# Patient Record
Sex: MALE | Race: WHITE | Employment: UNEMPLOYED | URBAN - METROPOLITAN AREA
[De-identification: names, ages, dates, MRNs, and addresses within clinical notes are randomized per-mention and may not be internally consistent; named-entity substitution may affect disease eponyms.]

---

## 2017-04-06 ENCOUNTER — OFFICE VISIT (OUTPATIENT)
Dept: FAMILY MEDICINE | Facility: CLINIC | Age: 52
End: 2017-04-06

## 2017-04-06 VITALS
HEIGHT: 70 IN | HEART RATE: 84 BPM | TEMPERATURE: 98 F | SYSTOLIC BLOOD PRESSURE: 114 MMHG | RESPIRATION RATE: 20 BRPM | OXYGEN SATURATION: 96 % | DIASTOLIC BLOOD PRESSURE: 74 MMHG | BODY MASS INDEX: 43.72 KG/M2 | WEIGHT: 305.4 LBS

## 2017-04-06 DIAGNOSIS — F14.10 COCAINE ABUSE, EPISODIC (H): ICD-10-CM

## 2017-04-06 DIAGNOSIS — F17.208 NICOTINE DEPENDENCE WITH OTHER NICOTINE-INDUCED DISORDER, UNSPECIFIED NICOTINE PRODUCT TYPE: ICD-10-CM

## 2017-04-06 DIAGNOSIS — E03.9 HYPOTHYROIDISM, UNSPECIFIED TYPE: ICD-10-CM

## 2017-04-06 DIAGNOSIS — G47.33 OSA (OBSTRUCTIVE SLEEP APNEA): ICD-10-CM

## 2017-04-06 DIAGNOSIS — Z76.89 ENCOUNTER TO ESTABLISH CARE: Primary | ICD-10-CM

## 2017-04-06 DIAGNOSIS — Z00.00 PREVENTATIVE HEALTH CARE: ICD-10-CM

## 2017-04-06 DIAGNOSIS — I10 ESSENTIAL HYPERTENSION: ICD-10-CM

## 2017-04-06 LAB
ALBUMIN SERPL-MCNC: 4.1 MG/DL (ref 3.8–5)
ALP SERPL-CCNC: 55 U/L (ref 31.7–110.7)
ALT SERPL-CCNC: 24.6 U/L (ref 0–45)
AST SERPL-CCNC: 23.9 U/L (ref 0–55)
BILIRUB SERPL-MCNC: <0.4 MG/DL (ref 0.2–1.3)
BUN SERPL-MCNC: 20.4 MG/DL (ref 7–21)
CALCIUM SERPL-MCNC: 9.4 MG/DL (ref 8.5–10.1)
CHLORIDE SERPLBLD-SCNC: 105.8 MMOL/L (ref 98–110)
CO2 SERPL-SCNC: 25.2 MMOL/L (ref 20–32)
CREAT SERPL-MCNC: 1.3 MG/DL (ref 0.7–1.3)
GFR SERPL CREATININE-BSD FRML MDRD: 61.6 ML/MIN/1.7 M2
GLUCOSE SERPL-MCNC: 105.4 MG'DL (ref 70–99)
HBA1C MFR BLD: 5.1 % (ref 4.1–5.7)
HCT VFR BLD AUTO: 42.4 % (ref 40–53)
HEMOGLOBIN: 13.3 G/DL (ref 13.3–17.7)
MCH RBC QN AUTO: 31.4 PG (ref 26.5–35)
MCHC RBC AUTO-ENTMCNC: 31.4 G/DL (ref 32–36)
MCV RBC AUTO: 100 FL (ref 78–100)
PLATELET # BLD AUTO: 255 K/UL (ref 150–450)
POTASSIUM SERPL-SCNC: 4.4 MMOL/DL (ref 3.3–4.5)
PROT SERPL-MCNC: 6.5 G/DL (ref 6.8–8.8)
RBC # BLD AUTO: 4.24 M/UL (ref 4.4–5.9)
SODIUM SERPL-SCNC: 142.1 MMOL/L (ref 132.6–141.4)
TSH SERPL DL<=0.05 MIU/L-ACNC: 20.6 MU/L (ref 0.4–4)
WBC # BLD AUTO: 9.5 K/UL (ref 4–11)

## 2017-04-06 ASSESSMENT — ENCOUNTER SYMPTOMS
NECK PAIN: 1
FEVER: 0
UNEXPECTED WEIGHT CHANGE: 1
HEMATURIA: 0
PALPITATIONS: 0
WHEEZING: 0
COUGH: 0
SHORTNESS OF BREATH: 1
DYSPHORIC MOOD: 1
ROS SKIN COMMENTS: +DRY SKIN
DYSURIA: 0
SLEEP DISTURBANCE: 1
FREQUENCY: 1
DIFFICULTY URINATING: 1
CHILLS: 0
POLYDIPSIA: 1

## 2017-04-06 ASSESSMENT — ANXIETY QUESTIONNAIRES
3. WORRYING TOO MUCH ABOUT DIFFERENT THINGS: SEVERAL DAYS
7. FEELING AFRAID AS IF SOMETHING AWFUL MIGHT HAPPEN: SEVERAL DAYS
6. BECOMING EASILY ANNOYED OR IRRITABLE: SEVERAL DAYS
GAD7 TOTAL SCORE: 6
5. BEING SO RESTLESS THAT IT IS HARD TO SIT STILL: SEVERAL DAYS
1. FEELING NERVOUS, ANXIOUS, OR ON EDGE: SEVERAL DAYS
IF YOU CHECKED OFF ANY PROBLEMS ON THIS QUESTIONNAIRE, HOW DIFFICULT HAVE THESE PROBLEMS MADE IT FOR YOU TO DO YOUR WORK, TAKE CARE OF THINGS AT HOME, OR GET ALONG WITH OTHER PEOPLE: VERY DIFFICULT
2. NOT BEING ABLE TO STOP OR CONTROL WORRYING: SEVERAL DAYS

## 2017-04-06 ASSESSMENT — PATIENT HEALTH QUESTIONNAIRE - PHQ9: 5. POOR APPETITE OR OVEREATING: NOT AT ALL

## 2017-04-06 NOTE — LETTER
April 7, 2017      Phan Eduardo  [address] [address]        Dear Phan,    Thank you for getting your care at [facility]. Please see below for your test results.    Your TSH level was elevated, which is likely due to not taking your thyroid medication regularly over the past several months.  Let's plan to have you on this consistently for the next 6 weeks and then recheck your TSH level again.     The screening test for Diabetes was normal and reassuring.     The blood test to look at your electrolytes, kidney and liver function was reassuring.      Resulted Orders   Hemoglobin A1c ([facility])   Result Value Ref Range    Hemoglobin A1C 5.1 4.1 - 5.7 %   Comprehensive Metabolic Panel ([facility])   Result Value Ref Range    Albumin 4.1 3.8 - 5.0 mg/dL    Alkaline Phosphatase 55.0 31.7 - 110.7 U/L    ALT 24.6 0.0 - 45.0 U/L    AST 23.9 0.0 - 55.0 U/L    Bilirubin Total <0.4 0.2 - 1.3 mg/dL    Urea Nitrogen 20.4 7.0 - 21.0 mg/dL    Calcium 9.4 8.5 - 10.1 mg/dL    Chloride 105.8 98.0 - 110.0 mmol/L    Carbon Dioxide 25.2 20.0 - 32.0 mmol/L    Creatinine 1.3 0.7 - 1.3 mg/dL    Glucose 105.4 (H) 70.0 - 99.0 mg'dL    Potassium 4.4 3.3 - 4.5 mmol/dL    Sodium 142.1 (H) 132.6 - 141.4 mmol/L    Protein Total 6.5 (L) 6.8 - 8.8 g/dL    GFR Estimate 61.6 >60.0 mL/min/1.7 m2    GFR Estimate If Black 74.6 >60.0 mL/min/1.7 m2   CBC with Plt ([facility])   Result Value Ref Range    WBC 9.5 4.0 - 11.0 K/uL    RBC 4.24 (L) 4.40 - 5.90 M/uL    Hemoglobin 13.3 13.3 - 17.7 g/dL    Hematocrit 42.4 40.0 - 53.0 %    .0 78.0 - 100.0 fL    MCH 31.4 26.5 - 35.0 pg    MCHC 31.4 (L) 32.0 - 36.0 g/dL    Platelets 255.0 150.0 - 450.0 K/uL   TSH   Result Value Ref Range    TSH 20.60 (H) 0.40 - 4.00 mU/L       If you have any concerns about these results please call and leave a message for me or send a MyChart message to the clinic.    Sincerely,    PINKY Sloan CNP

## 2017-04-06 NOTE — PROGRESS NOTES
HPI:       Phan Eduardo is a 51 year old who presents for the following  Patient presents with:  Swelling: in both legs  Thyroid Problem: check thyroid  Sleep Apnea: needs study    1.  Weight gain of 20 lbs in the past one month since starting treatment.  Weight has been up and down over the years.  Eating more since being at [facility].      2.  Intermittent swelling in LE's - left is worse, this has been chronic over many years - worse with increase in weight.      3.  History of sleep apnea.  Currently has a CPAP, which isn't functioning well.      4.  Would like to have colonoscopy done.        Tobacco:  4-5 cigarettes daily.   Alcohol:  None      Last had a check up several years ago.  PCP - Dr. Cummins.    Is currently at [facility], treatment for crack cocaine abuse.  History of several attempts at treatment.   Currently unemployed.  Was previously working with [employer].     PMH:  Sleep apnea, HTN, Hypothyroidism, GERD, ADD, and chronic neck pain.     History of irregular adherence with medications.    Has been taking levothyroxine, atenolol and omeprazole on a regular basis.  Didn't have money to get the rest of them.      Does skip Adderall intermittently.  Was previously diagnosed with ADD by a psychiatrist.    Dr. Placido Stern, prescribed Adderall for patient.     Problem, Medication and Allergy Lists were reviewed and are current.  Patient is a new patient to this clinic and so  I reviewed/updated the Past Medical History, the Family History and the Social History.          Review of Systems:   Review of Systems   Constitutional: Positive for unexpected weight change (weight gain). Negative for chills and fever.   HENT: Negative.    Eyes: Positive for visual disturbance (difficulty with reading).   Respiratory: Positive for shortness of breath. Negative for cough and wheezing.    Cardiovascular: Positive for leg swelling. Negative for chest pain and palpitations.  "  Endocrine: Positive for polydipsia.   Genitourinary: Positive for difficulty urinating and frequency. Negative for dysuria and hematuria.   Musculoskeletal: Positive for neck pain (chronic neck pain).   Skin:        +dry skin     Psychiatric/Behavioral: Positive for dysphoric mood (mood is \"ok\", was worse in the winter months) and sleep disturbance (history of sleep apnea, insomnia).        See HPI             Physical Exam:   Patient Vitals for the past 24 hrs:   BP Temp Temp src Pulse Resp SpO2 Height Weight   04/06/17 1334 114/74 98  F (36.7  C) Oral 84 20 96 % 5' 10\" (177.8 cm) (!) 305 lb 6.4 oz (138.5 kg)     Body mass index is 43.82 kg/(m^2).  Vitals were reviewed and were normal     Physical Exam   Constitutional: He is oriented to person, place, and time. He appears well-nourished. No distress.   Cardiovascular: Normal rate and regular rhythm.    Pulmonary/Chest: Effort normal and breath sounds normal. He has no wheezes.   Abdominal: Soft. Bowel sounds are normal. There is no tenderness. There is no rebound.   Musculoskeletal: He exhibits edema (left LE worse than right LE, +1 pitting edema).   Neurological: He is alert and oriented to person, place, and time.   Psychiatric: He has a normal mood and affect. His behavior is normal. Thought content normal. Cognition and memory are normal.         Results:     CMP, CBC, TSH, HgbA1C pending     Assessment and Plan     Phan was seen today for swelling, thyroid problem and sleep apnea.    Diagnoses and all orders for this visit:    Encounter to establish care  GUILLERMINA obtained for Jarred Cummins    [facility]  -     GASTROENTEROLOGY ADULT REF PROCEDURE ONLY - colonoscopy referral  -     Hemoglobin A1c ([facility])    ROBERTH (obstructive sleep apnea)  Insomnia  -     SLEEP EVALUATION & MANAGEMENT REFERRAL - ADULT; Future    Essential hypertension  -     Comprehensive Metabolic Panel ([facility])  -     CBC with Plt ([facility])  -     Currently taking " Atenolol, 20 mg once daily.  Was previously taking Lisinopril, 20 mg as well.   -     Will hold  Lisinopril, continue to monitor blood pressure control.      Hypothyroidism, unspecified type  -     TSH  -     Current levothyroxine dose is 175 mcg daily.      History of cocaine abuse  Currently in treatment at [facility], encouraged continue sobriety.     Nicotine dependence  Encouraged smoking cessation.       Follow-up in 2-4 weeks after sleep medicine consultation, sooner as needed.       Options for treatment and follow-up care were reviewed with the patient. Phan Eduardo  engaged in the decision making process and verbalized understanding of the options discussed and agreed with the final plan.    Kassy Hidalgo, PINKY CNP

## 2017-04-07 ASSESSMENT — ANXIETY QUESTIONNAIRES: GAD7 TOTAL SCORE: 6

## 2017-04-07 ASSESSMENT — PATIENT HEALTH QUESTIONNAIRE - PHQ9: SUM OF ALL RESPONSES TO PHQ QUESTIONS 1-9: 14

## 2017-04-11 ENCOUNTER — TELEPHONE (OUTPATIENT)
Dept: FAMILY MEDICINE | Facility: CLINIC | Age: 52
End: 2017-04-11

## 2017-04-11 DIAGNOSIS — E03.9 HYPOTHYROIDISM, UNSPECIFIED TYPE: ICD-10-CM

## 2017-04-11 DIAGNOSIS — K21.9 GASTROESOPHAGEAL REFLUX DISEASE, ESOPHAGITIS PRESENCE NOT SPECIFIED: Primary | ICD-10-CM

## 2017-04-11 DIAGNOSIS — I10 ESSENTIAL HYPERTENSION, BENIGN: ICD-10-CM

## 2017-04-11 RX ORDER — LEVOTHYROXINE SODIUM 175 UG/1
175 TABLET ORAL DAILY
Qty: 30 TABLET | Refills: 6 | Status: SHIPPED | OUTPATIENT
Start: 2017-04-11 | End: 2017-04-11

## 2017-04-11 RX ORDER — LEVOTHYROXINE SODIUM 175 UG/1
175 TABLET ORAL DAILY
Qty: 30 TABLET | Refills: 6 | COMMUNITY
Start: 2017-04-11 | End: 2017-11-30

## 2017-04-11 RX ORDER — ATENOLOL 25 MG/1
25 TABLET ORAL DAILY
Qty: 30 TABLET | Refills: 6 | Status: SHIPPED | OUTPATIENT
Start: 2017-04-11 | End: 2017-04-24

## 2017-04-11 NOTE — TELEPHONE ENCOUNTER
Kassy    Mr. Eduardo called the pharmacy this morning looking for refills on his medications including Adderall, Atenolol, levothyroxine, and omeprazole.  He was under the impression that you would be prescribing these medications for him after his visit with you on 4/6/17.  If appropriate, can you please send prescriptions to [facility]?      Thank you!    Arely Gibbs, PharmD  [facility]

## 2017-04-11 NOTE — TELEPHONE ENCOUNTER
Refill sent for levothyroxine, atenolol, and omeprazole.  Awaiting records from previous provider regarding ADHD, Adderall prescriptions. - Donald, CNP

## 2017-04-11 NOTE — TELEPHONE ENCOUNTER
Sounds great, Kassy.  Will double check on the atenolol when he picks up medications and will let you know if there are any problems.  Will also communicate info re: Adderall.     Thank you!!  Arely

## 2017-04-24 ENCOUNTER — OFFICE VISIT (OUTPATIENT)
Dept: FAMILY MEDICINE | Facility: CLINIC | Age: 52
End: 2017-04-24

## 2017-04-24 ENCOUNTER — TRANSFERRED RECORDS (OUTPATIENT)
Dept: HEALTH INFORMATION MANAGEMENT | Facility: CLINIC | Age: 52
End: 2017-04-24

## 2017-04-24 VITALS
OXYGEN SATURATION: 94 % | SYSTOLIC BLOOD PRESSURE: 114 MMHG | DIASTOLIC BLOOD PRESSURE: 77 MMHG | BODY MASS INDEX: 41.94 KG/M2 | HEART RATE: 80 BPM | WEIGHT: 299.6 LBS | HEIGHT: 71 IN | RESPIRATION RATE: 20 BRPM | TEMPERATURE: 97.7 F

## 2017-04-24 DIAGNOSIS — M79.89 SWELLING OF BOTH LOWER EXTREMITIES: ICD-10-CM

## 2017-04-24 DIAGNOSIS — G47.33 OSA (OBSTRUCTIVE SLEEP APNEA): ICD-10-CM

## 2017-04-24 DIAGNOSIS — I10 ESSENTIAL HYPERTENSION, BENIGN: ICD-10-CM

## 2017-04-24 DIAGNOSIS — Z72.0 TOBACCO ABUSE: ICD-10-CM

## 2017-04-24 DIAGNOSIS — Z01.818 PREOP GENERAL PHYSICAL EXAM: Primary | ICD-10-CM

## 2017-04-24 DIAGNOSIS — F98.8 ADD (ATTENTION DEFICIT DISORDER): ICD-10-CM

## 2017-04-24 DIAGNOSIS — H69.93 DYSFUNCTION OF EUSTACHIAN TUBE, BILATERAL: ICD-10-CM

## 2017-04-24 LAB — HEMOGLOBIN: 14.2 G/DL (ref 13.3–17.7)

## 2017-04-24 RX ORDER — ATENOLOL 25 MG/1
25 TABLET ORAL DAILY
Qty: 30 TABLET | Refills: 6 | Status: SHIPPED | OUTPATIENT
Start: 2017-04-24

## 2017-04-24 RX ORDER — CETIRIZINE HYDROCHLORIDE 10 MG/1
10 TABLET ORAL EVERY EVENING
Qty: 30 TABLET | Refills: 6 | Status: SHIPPED | OUTPATIENT
Start: 2017-04-24

## 2017-04-24 RX ORDER — HYDROCHLOROTHIAZIDE 25 MG/1
25 TABLET ORAL DAILY
Qty: 30 TABLET | Refills: 6 | Status: SHIPPED | OUTPATIENT
Start: 2017-04-24 | End: 2017-11-30

## 2017-04-24 RX ORDER — DEXTROAMPHETAMINE SACCHARATE, AMPHETAMINE ASPARTATE MONOHYDRATE, DEXTROAMPHETAMINE SULFATE AND AMPHETAMINE SULFATE 7.5; 7.5; 7.5; 7.5 MG/1; MG/1; MG/1; MG/1
30 CAPSULE, EXTENDED RELEASE ORAL DAILY
Qty: 30 CAPSULE | Refills: 0 | Status: SHIPPED | OUTPATIENT
Start: 2017-04-24 | End: 2017-06-05

## 2017-04-24 RX ORDER — ATENOLOL 50 MG/1
50 TABLET ORAL DAILY
Qty: 30 TABLET | Refills: 6 | Status: SHIPPED | OUTPATIENT
Start: 2017-04-24 | End: 2017-04-24

## 2017-04-24 RX ORDER — ALBUTEROL SULFATE 90 UG/1
2 AEROSOL, METERED RESPIRATORY (INHALATION) EVERY 6 HOURS
Status: ON HOLD | COMMUNITY
End: 2017-05-18

## 2017-04-24 NOTE — PATIENT INSTRUCTIONS
QUIT PLAN:    Current cig:  Put it away:  Locker.       Lozenges. :    From pharmacy today-  Start first thing in the morning.        Here is the plan from today's visit    1. Preop general physical exam  Results for orders placed or performed in visit on 04/24/17   Hemoglobin (HGB) (hospitals)   Result Value Ref Range    Hemoglobin 14.2 13.3 - 17.7 g/dL   EKG 12-lead complete w/read - Clinics    Narrative    EKG Interpretation:  By PINKY Sparrow CNP    Indication:Pre op evaluation  Symptoms at time of EKG: None   Interpretation: appears normal, NSR, normal axis, normal intervals, no acute ST/T changes c/w ischemia, no LVH by voltage criteria, there are no prior tracings available  Comparison with previous EKGs:Previous exam unavailable    Patient informed at visit.           2. Essential hypertension, benign  - hydrochlorothiazide (HYDRODIURIL) 25 MG tablet; Take 1 tablet (25 mg) by mouth daily  Dispense: 30 tablet; Refill: 6  - Atenolol, 25 mg once daily.   -    3. Dysfunction of eustachian tube, bilateral  - cetirizine (ZYRTEC) 10 MG tablet; Take 1 tablet (10 mg) by mouth every evening  Dispense: 30 tablet; Refill: 6    4. Swelling of both lower extremities    - Compression Stockings (TEDS, JOBST) Print Out      Follow-up in 2-4 weeks for recheck of blood pressure, sooner as needed.       Thank you for coming to [facility] today.  Lab Testing:  **If you had lab testing today and your results are reassuring or normal they will be mailed to you or sent through [employer] within 7 days.   **If the lab tests need quick action we will call you with the results.  The phone number we will call with results is # [phone] (home) . If this is not the best number please call our clinic and change the number.  Medication Refills:  If you need any refills please call your pharmacy and they will contact us.   If you need to  your refill at a new pharmacy, please contact the new pharmacy directly.  The new pharmacy will help you get your medications transferred faster.   Scheduling:  If you have any concerns about today's visit or wish to schedule another appointment please call our office during normal business hours [phone] (8-5:00 M-F)  If a referral was made to a [facility] and you don't get a call from central scheduling please call [phone].  If a Mammogram was ordered for you at [facility] call [phone] to schedule or change your appointment.  If you had an XRay/CT/Ultrasound/MRI ordered the number is [phone] to schedule or change your radiology appointment.   Medical Concerns:  If you have urgent medical concerns please call [phone] at any time of the day.  If you have a medical emergency please call [phone].    Presurgery Checklist  You are scheduled to have surgery. The healthcare staff will try to make your stay comfortable. Use the guidelines below to remind yourself what to do before surgery. Be sure to follow any specific pre-op instructions from your surgeon or nurse.   Preparing for Surgery  Ask your surgeon if you ll need a blood transfusion during surgery and if so, how to prepare for it. In some cases, you can donate blood before surgery. If needed, this blood can be given back (transfused) to you during or after surgery.  If you are having abdominal surgery, ask what you need to do to clear your bowel.  Tell your surgeon if you have allergies to any medications or foods.  Arrange for an adult family member or friend to drive you home after surgery. If possible, have someone ready to help you at home as you recover.  Call the surgeon if you get a cold, fever, sore throat, diarrhea, or other health problem just before surgery. Your surgeon can decide whether or not to postpone the surgery.  Medications  Tell your surgeon about all medications you take, including prescription and over-the-counter products such as herbal remedies and vitamins.  Ask if you should continue taking them.  If you take ibuprofen, naproxen, or  blood thinners  such as aspirin, clopidogrel (Plavix), or warfarin (Coumadin), ask your surgeon whether you should stop taking them and how long before surgery you should stop.  You may be told to take antibiotics just before surgery to prevent infection. If so, follow instructions carefully on how to take them.  If you are told to take medications called anticoagulants to prevent blood clots after surgery, be sure to follow the instructions on how to take them.  Stop Smoking  If you smoke, healing may take longer. So at least 2 week(s) before surgery, stop smoking.  Bathing or Showering Before Surgery  If instructed, wash with antibacterial soap. Afterward, do not use lotions or powders.  If you are having surgery on the head, you may be asked to shampoo with antibacterial soap. Follow instructions for doing so.  Do Not Remove Hair from the Surgery Site  Do not shave hair from the incision site, unless you are given specific instructions to do so. Usually, if hair needs to be removed, it will be done at the hospital right before surgery.  Don t Eat or Drink  Your doctor will tell you when to stop eating and drinking. If you do not follow your doctor's instructions, your procedure may be postponed or rescheduled for another day.  If your surgeon tells you to continue any medications, take them with small sips of water.  You can brush your teeth and rinse your mouth, but don t swallow any water.  Day of Surgery  Do not wear makeup. Do not use perfume, deodorant, or hairspray. Remove nail polish and artificial nails.  Leave jewelry (including rings), watches, and other valuables at home.  Be sure to bring health insurance cards or forms and a photo ID.  Bring a list of your medications (include the name, dose, how often you take them, and the time last dose was taken).  Arrive on time at the hospital or surgery facility.

## 2017-04-24 NOTE — PROGRESS NOTES
"  Clinical Pharmacy Note     Phan was referred by Kassy Hidalgo CNP  for pharmacy services for smoking cessation       Med List Review:  Discussed today medication indications, dosage and effectiveness.    Patient does not bring their medication to the visit today.  Discussed use of OTC meds today: none    Patient reported being compliant all of the time   Patient reports no side effects.    Subjective:  Phan is here today for a pre-op apt.  He is interested in quitting.     Smoking Cessation     Smoking Cessation     Smoking History:  30 years       PPD: 5-15 per day --- individual....   Roll his own.        Quit History:    Times quit:    1    Most recent quit attempt:  2 years ago quit for a short period of time.    Medications used in the past:    Patches    Longest period quit:    ? Short time    Motivation to quit smoking:    To feel healthier  Smoking triggers:    Before or after classes    Free time    Before bed    After dinner    Wake---- few hours before smoking.      Barriers to quit smoking:    Becomes anxious     Bored      Hard to break habit     driving   Relapse Triggers:    NA       Desire to quit smokin-10:  NA   He describes this as very high .   Confidence to quit smokin-10:  NA           Charting out pro/cons about smoking    GOOD BAD   Smoking    Habit     Something to do               Taste    Smell    Feel bad being around others    Breathing is bad now - labored        QUITTING   senior care     Bring in smoke free places    Working - work day.      Feel healthier    Breath better    Exercising... Running.      Outdoors - camping, fishing, hunting        Becomes anxious     Bored      Hard to break     driving         Objective:    /77  Pulse 80  Temp 97.7  F (36.5  C) (Oral)  Resp 20  Ht 5' 10.5\" (179.1 cm)  Wt 299 lb 9.6 oz (135.9 kg)  SpO2 94%  BMI 42.38 kg/m2  BP Readings from Last 6 Encounters:   17 114/77   17 114/74     Estimated Creatinine Clearance: 93 " "mL/min (based on Cr of 1.3).  GFR Estimate   Date Value Ref Range Status   04/06/2017 61.6 >60.0 mL/min/1.7 m2 Final     GFR Estimate If Black   Date Value Ref Range Status   04/06/2017 74.6 >60.0 mL/min/1.7 m2 Final         /77  Pulse 80  Temp 97.7  F (36.5  C) (Oral)  Resp 20  Ht 5' 10.5\" (179.1 cm)  Wt 299 lb 9.6 oz (135.9 kg)  SpO2 94%  BMI 42.38 kg/m2    Drug Therapy Assessment:  Drug therapy problems identified:           6. Tobacco abuse       Status:  uncontrolled       DTP:  Needs Additional Therapy: untreated condition  , DTP degree:2            Phan has a good desire to quit smoking    Desired quit date is tomorrow    He wanted to start with Chantix because he heard that was a good drug from a friend.    I explained that this drug could cause some depression and because we are only now getting to know him, that it may not be a good first line agent.      He agrees to use NRT Serafin.    He sets quit date. But become frustrated with any further planning.  He did not want to create a plan for avoiding smoking tomorrow.  He sets the plan to move his tobacco from his room to the locker to prep for tomorrow.    I have described how to use the product(s).  I have explained related adverse effects of the drug therapy to the patient today.            All medications were reviewed and found to be indicated, effective, safe and convenient unless drug therapy problem identified as described above.        Drug Therapy Plan and Follow up:    PLan    1. Tobacco abuse  rx sent to [facility]   - nicotine polacrilex (COMMIT) 4 MG lozenge; Place 1 lozenge (4 mg) inside cheek as needed for smoking cessation (every 2-3 hours as needed for cravings)  Dispense: 360 tablet; Refill: 1  Max 17  - SMOKING CESSATION COUNSELING >10 MIN (Tobacco Nicotine) [86895]      =        Follow up: 2 weeks with Kassy Hidalgo   Patient was provided with written instructions/medication list via AVS        Options for " treatment and/or follow-up care were reviewed with the patient. Patient was engaged and actively involved in the decision making process.  Phan Eduardo verbalized understanding of the options discussed and was satisfied with the final plan.      Kassy Hidalgo CNP  was provided my action  in clinic today and  was available for supervision during this visit and is the authorizing prescriber for this visit through the pharmacist collaborative practice agreement.      Carmine Gunter, Pharm.D             Total Time: 20  # DTPs Identified: 1    Medical Condition 1: Smoking/Tobacco, Goals of therapy: Not at goal, Drug Class: NRT, Indication: Needs additional drug therapy,  ,  ,  , Intervention: Initiate drug, Verification: Patient Agreed - OTC   ,  ,  ,  ,  ,  ,  ,  ,     ,  ,  ,  ,  ,  ,  ,  ,     ,  ,  ,

## 2017-04-24 NOTE — MR AVS SNAPSHOT
After Visit Summary   4/24/2017    Phan Eduardo    MRN: [id number]           Patient Information     Date Of Birth          1965        Visit Information        Provider Department      4/24/2017 1:40 PM Kassy Hidalgo APRN CNP [facility]        Today's Diagnoses     Preop general physical exam    -  1    Essential hypertension, benign        Dysfunction of eustachian tube, bilateral        Swelling of both lower extremities        ADD (attention deficit disorder)        Tobacco abuse        ROBERTH (obstructive sleep apnea)          Care Instructions    QUIT PLAN:    Current cig:  Put it away:  Locker.       Lozenges. :    From pharmacy today-  Start first thing in the morning.        Here is the plan from today's visit    1. Preop general physical exam  Results for orders placed or performed in visit on 04/24/17   Hemoglobin (HGB) ([facility])   Result Value Ref Range    Hemoglobin 14.2 13.3 - 17.7 g/dL   EKG 12-lead complete w/read - Clinics    Narrative    EKG Interpretation:  By PINKY Sparrow CNP    Indication:Pre op evaluation  Symptoms at time of EKG: None   Interpretation: appears normal, NSR, normal axis, normal intervals, no acute ST/T changes c/w ischemia, no LVH by voltage criteria, there are no prior tracings available  Comparison with previous EKGs:Previous exam unavailable    Patient informed at visit.           2. Essential hypertension, benign  - hydrochlorothiazide (HYDRODIURIL) 25 MG tablet; Take 1 tablet (25 mg) by mouth daily  Dispense: 30 tablet; Refill: 6  - Atenolol, 25 mg once daily.   -    3. Dysfunction of eustachian tube, bilateral  - cetirizine (ZYRTEC) 10 MG tablet; Take 1 tablet (10 mg) by mouth every evening  Dispense: 30 tablet; Refill: 6    4. Swelling of both lower extremities    - Compression Stockings (TEDS, JOBST) Print Out      Follow-up in 2-4 weeks for recheck of blood pressure, sooner as needed.       Thank you for  coming to [facility] today.  Lab Testing:  **If you had lab testing today and your results are reassuring or normal they will be mailed to you or sent through [employer] within 7 days.   **If the lab tests need quick action we will call you with the results.  The phone number we will call with results is # [phone] (home) . If this is not the best number please call our clinic and change the number.  Medication Refills:  If you need any refills please call your pharmacy and they will contact us.   If you need to  your refill at a new pharmacy, please contact the new pharmacy directly. The new pharmacy will help you get your medications transferred faster.   Scheduling:  If you have any concerns about today's visit or wish to schedule another appointment please call our office during normal business hours [phone] (8-5:00 M-F)  If a referral was made to a [facility] and you don't get a call from central scheduling please call [phone].  If a Mammogram was ordered for you at [facility] call [phone] to schedule or change your appointment.  If you had an XRay/CT/Ultrasound/MRI ordered the number is [phone] to schedule or change your radiology appointment.   Medical Concerns:  If you have urgent medical concerns please call [phone] at any time of the day.  If you have a medical emergency please call [phone].    Presurgery Checklist  You are scheduled to have surgery. The healthcare staff will try to make your stay comfortable. Use the guidelines below to remind yourself what to do before surgery. Be sure to follow any specific pre-op instructions from your surgeon or nurse.   Preparing for Surgery  Ask your surgeon if you ll need a blood transfusion during surgery and if so, how to prepare for it. In some cases, you can donate blood before surgery. If needed, this blood can be given back (transfused) to you during or after surgery.  If you are having  abdominal surgery, ask what you need to do to clear your bowel.  Tell your surgeon if you have allergies to any medications or foods.  Arrange for an adult family member or friend to drive you home after surgery. If possible, have someone ready to help you at home as you recover.  Call the surgeon if you get a cold, fever, sore throat, diarrhea, or other health problem just before surgery. Your surgeon can decide whether or not to postpone the surgery.  Medications  Tell your surgeon about all medications you take, including prescription and over-the-counter products such as herbal remedies and vitamins. Ask if you should continue taking them.  If you take ibuprofen, naproxen, or  blood thinners  such as aspirin, clopidogrel (Plavix), or warfarin (Coumadin), ask your surgeon whether you should stop taking them and how long before surgery you should stop.  You may be told to take antibiotics just before surgery to prevent infection. If so, follow instructions carefully on how to take them.  If you are told to take medications called anticoagulants to prevent blood clots after surgery, be sure to follow the instructions on how to take them.  Stop Smoking  If you smoke, healing may take longer. So at least 2 week(s) before surgery, stop smoking.  Bathing or Showering Before Surgery  If instructed, wash with antibacterial soap. Afterward, do not use lotions or powders.  If you are having surgery on the head, you may be asked to shampoo with antibacterial soap. Follow instructions for doing so.  Do Not Remove Hair from the Surgery Site  Do not shave hair from the incision site, unless you are given specific instructions to do so. Usually, if hair needs to be removed, it will be done at the hospital right before surgery.  Don t Eat or Drink  Your doctor will tell you when to stop eating and drinking. If you do not follow your doctor's instructions, your procedure may be postponed or rescheduled for another day.  If your  surgeon tells you to continue any medications, take them with small sips of water.  You can brush your teeth and rinse your mouth, but don t swallow any water.  Day of Surgery  Do not wear makeup. Do not use perfume, deodorant, or hairspray. Remove nail polish and artificial nails.  Leave jewelry (including rings), watches, and other valuables at home.  Be sure to bring health insurance cards or forms and a photo ID.  Bring a list of your medications (include the name, dose, how often you take them, and the time last dose was taken).  Arrive on time at the hospital or surgery facility.        Follow-ups after your visit        Your next 10 appointments already scheduled     May 02, 2017  2:00 PM CDT   New Sleep Patient with Narcisa Moe MD   [facility] [place], Sleep Study ([facility])    [address] [place] [id number] [phone]            May 18, 2017   Procedure with Amy Gilbert MD   [facility] [place], Endoscopy ([facility])    [address] [facility] [id number] [phone]           The [facility] is located on the corner of [address] and [address] on the [facility]. It is easily accessible from virtually any point in the [facility] area, via Q[address]35 and [address].              Who to contact     Please call your clinic at [phone] to:    Ask questions about your health    Make or cancel appointments    Discuss your medicines    Learn about your test results    Speak to your doctor   If you have compliments or concerns about an experience at your clinic, or if you wish to file a complaint, please contact [facility] Patient Relations at [phone] or email us at [email or web address]         Additional Information About Your Visit        MyChart Information     [employer] is an electronic  "gateway that provides easy, online access to your medical records. With [employer], you can request a clinic appointment, read your test results, renew a prescription or communicate with your care team.     To sign up for [employer] visit the website at [email or web address]   You will be asked to enter the access code listed below, as well as some personal information. Please follow the directions to create your username and password.     Your access code is: [email or web address]  Expires: 2017  2:12 PM     Your access code will  in 90 days. If you need help or a new code, please contact your [facility] or call [phone] for assistance.        Care EveryWhere ID     This is your Care EveryWhere ID. This could be used by other organizations to access your [place] medical records  [id number]        Your Vitals Were     Pulse Temperature Respirations Height Pulse Oximetry BMI (Body Mass Index)    80 97.7  F (36.5  C) (Oral) 20 5' 10.5\" (179.1 cm) 94% 42.38 kg/m2       Blood Pressure from Last 3 Encounters:   17 114/77   17 114/74    Weight from Last 3 Encounters:   17 299 lb 9.6 oz (135.9 kg)   17 (!) 305 lb 6.4 oz (138.5 kg)              We Performed the Following     Compression Stockings (TEDS, JOBST) Print Out     EKG 12-lead complete w/read - Clinics     Hemoglobin (HGB) ([facility])          Today's Medication Changes          These changes are accurate as of: 17  3:10 PM.  If you have any questions, ask your nurse or doctor.               Start taking these medicines.        Dose/Directions    amphetamine-dextroamphetamine 30 MG per 24 hr capsule   Commonly known as:  ADDERALL XR   Used for:  ADD (attention deficit disorder)   Replaces:  ADDERALL PO   Started by:  Kassy Hidalgo APRN CNP        Dose:  30 mg   Take 1 capsule (30 mg) by mouth daily   Quantity:  30 capsule   Refills:  0       atenolol 25 MG tablet   Commonly known as:  " TENORMIN   Used for:  Essential hypertension, benign   Started by:  Kassy Hidalgo APRN CNP        Dose:  25 mg   Take 1 tablet (25 mg) by mouth daily   Quantity:  30 tablet   Refills:  6       cetirizine 10 MG tablet   Commonly known as:  zyrTEC   Used for:  Dysfunction of eustachian tube, bilateral   Started by:  Kassy Hidalgo APRN CNP        Dose:  10 mg   Take 1 tablet (10 mg) by mouth every evening   Quantity:  30 tablet   Refills:  6       hydrochlorothiazide 25 MG tablet   Commonly known as:  HYDRODIURIL   Used for:  Essential hypertension, benign   Started by:  Kassy Hidalgo APRN CNP        Dose:  25 mg   Take 1 tablet (25 mg) by mouth daily   Quantity:  30 tablet   Refills:  6       nicotine polacrilex 4 MG lozenge   Commonly known as:  COMMIT   Used for:  Tobacco abuse   Started by:  Kassy Hidalgo APRN CNP        Dose:  4 mg   Place 1 lozenge (4 mg) inside cheek as needed for smoking cessation (every 2-3 hours as needed for cravings)   Quantity:  360 tablet   Refills:  1         Stop taking these medicines if you haven't already. Please contact your care team if you have questions.     ADDERALL PO   Replaced by:  amphetamine-dextroamphetamine 30 MG per 24 hr capsule   Stopped by:  Kassy Hidalgo APRN CNP                Where to get your medicines      These medications were sent to [facility] [place], [place] - [address]   [address] [facility] [address]     Phone:  [phone]     atenolol 25 MG tablet    cetirizine 10 MG tablet    hydrochlorothiazide 25 MG tablet    nicotine polacrilex 4 MG lozenge         Some of these will need a paper prescription and others can be bought over the counter.  Ask your nurse if you have questions.     Bring a paper prescription for each of these medications     amphetamine-dextroamphetamine 30 MG per 24 hr capsule                Primary Care Provider Office Phone # Fax #    PINKY Acevedo CNP  [phone]       [facility] [address] [facility] [address]        Thank you!     Thank you for choosing hospitals [facility]  for your care. Our goal is always to provide you with excellent care. Hearing back from our patients is one way we can continue to improve our services. Please take a few minutes to complete the written survey that you may receive in the mail after your visit with us. Thank you!             Your Updated Medication List - Protect others around you: Learn how to safely use, store and throw away your medicines at www.disposemymeds.org.          This list is accurate as of: 4/24/17  3:10 PM.  Always use your most recent med list.                   Brand Name Dispense Instructions for use    albuterol 108 (90 BASE) MCG/ACT Inhaler    PROAIR HFA/PROVENTIL HFA/VENTOLIN HFA     Inhale 2 puffs into the lungs every 6 hours       amphetamine-dextroamphetamine 30 MG per 24 hr capsule    ADDERALL XR    30 capsule    Take 1 capsule (30 mg) by mouth daily       atenolol 25 MG tablet    TENORMIN    30 tablet    Take 1 tablet (25 mg) by mouth daily       AZITHROMYCIN PO      Take 250 mg by mouth daily       cetirizine 10 MG tablet    zyrTEC    30 tablet    Take 1 tablet (10 mg) by mouth every evening       fluticasone 50 MCG/BLIST Aepb    FLOVENT DISKUS     Inhale 1 puff into the lungs every 12 hours       hydrochlorothiazide 25 MG tablet    HYDRODIURIL    30 tablet    Take 1 tablet (25 mg) by mouth daily       levothyroxine 175 MCG tablet    SYNTHROID/LEVOTHROID    30 tablet    Take 1 tablet (175 mcg) by mouth daily       nicotine polacrilex 4 MG lozenge    COMMIT    360 tablet    Place 1 lozenge (4 mg) inside cheek as needed for smoking cessation (every 2-3 hours as needed for cravings)       omeprazole 20 MG CR capsule    priLOSEC    30 capsule    Take 1 capsule (20 mg) by mouth daily

## 2017-04-24 NOTE — PROGRESS NOTES
[facility]  [address] [place] [address],  [address]  [facility] [address]  Phone: [phone]  Fax: [phone]    4/24/2017    Adult PRE-OP Evaluation:    Phan Eduardo, 1965 presents for pre-operative evaluation and assessment as requested by Dr. Gilbert, prior to undergoing surgery/procedure for treatment of Colonoscopy under MAC.   Proposed procedure: Colonoscopy    Date of Surgery/ Procedure: 5/18/17  Hospital/Surgical Facility:    [facility]      [address]      Fax: [phone]      Phone: [phone]     Primary Physician: Kassy Hidalgo  Type of Anesthesia Anticipated: MAC  History of anesthesia complications: NONE  History of  abnormal bleeding: NONE    History of blood transfusions: NO  Patient has a Health Care Directive or Living Will:  NO    Preoperative Questions   1. NO - Do you have a history of heart attack, stroke, stent, bypass or surgery on an artery in the head, neck, heart or legs?  2. NO - Do you ever have any pain or discomfort in your chest?  3. NO - Have you ever had a severe pain across the front of your chest lasting for half an hour or more?  4. NO - Do you have a history of Congestive Heart Failure?  5. NO - Are you troubled by shortness of breath when: walking on the level/ up a slight hill/ at night?  6. NO - Does your chest ever sound wheezy or whistling?  7. YES - Do you currently have a cold, bronchitis or other respiratory infection?  Was treated for acute bronchitis on 4/21/17 with Azithromycin.   8. NO - Have you had a cold, bronchitis or other respiratory infection within the last 2 weeks?  9. NO - Do you usually have a cough?  10. NO - Do you sometimes get pains in the calves of your legs when you walk?  11. NO - Do you or anyone in your family have previous history of blood clots?  12. NO - Do you or does anyone in your family have a serious bleeding problem such as prolonged bleeding following surgeries or cuts?  13. NO -  Have you ever had problems with anemia or been told to take iron pills?  14. NO - Have you had any abnormal blood loss such as black, tarry or bloody stools, or abnormal vaginal bleeding?  15. NO - Have you ever had a blood transfusion?  16. NO - Have you or any of your relatives ever had problems with anesthesia?  17. YES - Do you have sleep apnea, excessive snoring or daytime drowsiness? Sleep medicine consult appointment   18. NO - Do you have any prosthetic heart valves?  19. NO - Do you have prosthetic joints?  20. NO - Is there any chance that you may be pregnant?    Patient Active Problem List   Diagnosis     Nicotine dependence with other nicotine-induced disorder, unspecified nicotine product type     History of cocaine abuse     Hypothyroidism, unspecified type     Essential hypertension     ROBERTH (obstructive sleep apnea)         Current Outpatient Prescriptions on File Prior to Visit:  omeprazole (PRILOSEC) 20 MG CR capsule Take 1 capsule (20 mg) by mouth daily   atenolol (TENORMIN) 25 MG tablet Take 1 tablet (25 mg) by mouth daily   levothyroxine (SYNTHROID/LEVOTHROID) 175 MCG tablet Take 1 tablet (175 mcg) by mouth daily   Amphetamine-Dextroamphetamine (ADDERALL PO) Take 30 mg by mouth     No current facility-administered medications on file prior to visit.     OTC products: no recent use of OTC ASA, NSAIDS or Steroids    No Known Allergies  Latex Allergy: NO    Social History     Social History     Marital status:      Spouse name: N/A     Number of children: N/A     Years of education: N/A     Social History Main Topics     Smoking status: Current Every Day Smoker     Types: Cigarettes     Smokeless tobacco: None     Alcohol use No     Drug use: No     Sexual activity: Not Asked     Other Topics Concern     None     Social History Narrative       REVIEW OF SYSTEMS:   C: NEGATIVE for fever, chills, change in weight  I: NEGATIVE for worrisome rashes, moles or lesions  E: NEGATIVE for irritation,  "+decreased visual acuity, has eye glasses  E/M: NEGATIVE for mouth and throat problems, +Ear congestion   R: NEGATIVE for significant SOB, +Cough - recent treatment for bronchitis.   B: NEGATIVE for masses, tenderness or discharge  CV: NEGATIVE for chest pain, palpitations, +swelling in left LE  GI: NEGATIVE for nausea, abdominal pain, heartburn, or change in bowel habits  : NEGATIVE for dysuria, or hematuria, +urinary frequency  M: NEGATIVE for significant arthralgias or myalgia  N: NEGATIVE for weakness, dizziness or paresthesias  E: NEGATIVE for temperature intolerance, skin/hair changes  H: NEGATIVE for bleeding problems  P: NEGATIVE for changes in mood or affect    EXAM:   Patient Vitals for the past 24 hrs:   BP Temp Temp src Pulse Resp SpO2 Height Weight   04/24/17 1352 114/77 97.7  F (36.5  C) Oral 80 20 94 % 5' 10.5\" (179.1 cm) 299 lb 9.6 oz (135.9 kg)     Body mass index is 42.38 kg/(m^2).  GENERAL: healthy, alert and no distress  EYES: Eyes grossly normal to inspection, extraocular movements - intact, and PERRL  HENT: ear canals- normal; TMs- bilateral TM's with fluid visible, no erythema, Nose- normal; Mouth- no ulcers, no lesions  NECK: no tenderness, no adenopathy, no asymmetry, no masses, no stiffness  RESP: lungs clear to auscultation - no rales, no rhonchi, no wheezes  CV: regular rates and rhythm, normal S1 S2, no S3 or S4 and no murmur  ABDOMEN: soft, no tenderness, no  hepatosplenomegaly, no masses, normal bowel sounds  MS: extremities- no gross deformities noted, left LE with +1 pitting edema  SKIN: no suspicious lesions, no rashes  NEURO: strength and tone- normal, sensory exam- grossly normal, mentation- intact, speech- normal, reflexes- symmetric  BACK: no CVA tenderness, no paralumbar tenderness  PSYCH: Alert and oriented times 3; speech- coherent , normal rate and volume; able to articulate logical thoughts  LYMPHATICS: ant. cervical- normal, post. cervical- normal    DIAGNOSTICS:  "     EKG: appears normal, NSR, normal axis, normal intervals, no acute ST/T changes c/w ischemia, no LVH by voltage criteria, there are no prior tracings available  Hemoglobin (indicated for history of anemia or procedure with significant blood loss such as tonsillectomy, major intraperitoneal surgery, vascular surgery, major spine surgery, total joint replacement)    RISK ASSESSMENT:     Cardiovascular Risk:  -Patient is able to participate in strenuous activities without chest pain.  -The patient does not have chest pain with exertion.  -Patient does not have a history of congestive heart failure.    -The patient does not have a history of stroke and does not have a history of valvular disease.    Pulmonary Risk:  -In terms of risk factors for pulmonary complication, the patient has no risk factors    Perioperative Complications:  -The patient does not have a history of bleeding or clotting problems in the past.    -The patient has not had complications from surgeries.    -The patient does not have a family history of any anesthesia or surgical complications.      IMPRESSION:   Reason for surgery/procedure: colonoscopy under MAC    The proposed surgical procedure is considered LOW risk.    For above listed surgery and anesthesia:   Patient is at low risk for surgery/procedure and perioperative/procedure complications.    RECOMMENDATIONS:     Phan was seen today for pre-op exam and recheck.    Diagnoses and all orders for this visit:    Preop general physical exam  -     Hemoglobin (HGB) ([facility])  -     EKG 12-lead complete w/read - Clinics  Preop Plan:  --Approval given to proceed with proposed procedure, without further diagnostic evaluation  --History of Sleep apnea, colonoscopy to be performed under MAC.   --History of nicotine dependence.  Advised smoking cessation.     Medications:  Patient should hold their regular medications the morning of surgery unless otherwise instructed.    Hold aspirin 7 days prior  to surgery.  Hold ibuprofen for 5 days prior.    Essential hypertension, benign  -     hydrochlorothiazide (HYDRODIURIL) 25 MG tablet; Take 1 tablet (25 mg) by mouth daily  -     atenolol (TENORMIN) 25 MG tablet; Take 1 tablet (25 mg) by mouth daily    Dysfunction of eustachian tube, bilateral  -     cetirizine (ZYRTEC) 10 MG tablet; Take 1 tablet (10 mg) by mouth every evening    Swelling of both lower extremities  -     Compression Stockings (TEDS, JOBST) Print Out    ADD (attention deficit disorder)  -     amphetamine-dextroamphetamine (ADDERALL XR) 30 MG per 24 hr capsule; Take 1 capsule (30 mg) by mouth daily    Nicotine Dependence  PharmD met with patient, spent 25 minutes on smoking cessation.       Kassy Hidalgo, PINKY CORDOVA    Please contact our office if there are any further questions or information required about this patient.

## 2017-05-02 ENCOUNTER — OFFICE VISIT (OUTPATIENT)
Dept: SLEEP MEDICINE | Facility: CLINIC | Age: 52
End: 2017-05-02
Attending: NURSE PRACTITIONER
Payer: COMMERCIAL

## 2017-05-02 VITALS
HEIGHT: 71 IN | HEART RATE: 112 BPM | DIASTOLIC BLOOD PRESSURE: 94 MMHG | SYSTOLIC BLOOD PRESSURE: 128 MMHG | OXYGEN SATURATION: 92 % | WEIGHT: 303 LBS | BODY MASS INDEX: 42.42 KG/M2 | RESPIRATION RATE: 18 BRPM

## 2017-05-02 DIAGNOSIS — G47.33 OSA (OBSTRUCTIVE SLEEP APNEA): Primary | ICD-10-CM

## 2017-05-02 PROCEDURE — 99211 OFF/OP EST MAY X REQ PHY/QHP: CPT | Mod: ZF
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
## 2017-05-02 NOTE — NURSING NOTE
"Chief Complaint   Patient presents with     Consult     Continuation of care for cpap. Has not been able to use machine for the last year.       Initial Ht 1.791 m (5' 10.5\")  Wt (!) 137.4 kg (303 lb)  BMI 42.86 kg/m2 Estimated body mass index is 42.86 kg/(m^2) as calculated from the following:    Height as of this encounter: 1.791 m (5' 10.5\").    Weight as of this encounter: 137.4 kg (303 lb).  Medication Reconciliation: complete     Neck circumference: 18 1/2 inches.  47 cm        ANGELIC Barnett        "

## 2017-05-02 NOTE — PATIENT INSTRUCTIONS
Your BMI is Body mass index is 42.86 kg/(m^2).  Weight management is a personal decision.  If you are interested in exploring weight loss strategies, the following discussion covers the approaches that may be successful. Body mass index (BMI) is one way to tell whether you are at a healthy weight, overweight, or obese. It measures your weight in relation to your height.  A BMI of 18.5 to 24.9 is in the healthy range. A person with a BMI of 25 to 29.9 is considered overweight, and someone with a BMI of 30 or greater is considered obese. More than two-thirds of American adults are considered overweight or obese.  Being overweight or obese increases the risk for further weight gain. Excess weight may lead to heart disease and diabetes.  Creating and following plans for healthy eating and physical activity may help you improve your health.  Weight control is part of healthy lifestyle and includes exercise, emotional health, and healthy eating habits. Careful eating habits lifelong are the mainstay of weight control. Though there are significant health benefits from weight loss, long-term weight loss with diet alone may be very difficult to achieve- studies show long-term success with dietary management in less than 10% of people. Attaining a healthy weight may be especially difficult to achieve in those with severe obesity. In some cases, medications, devices and surgical management might be considered.  What can you do?  If you are overweight or obese and are interested in methods for weight loss, you should discuss this with your provider.     Consider reducing daily calorie intake by 500 calories.     Keep a food journal.     Avoiding skipping meals, consider cutting portions instead.    Diet combined with exercise helps maintain muscle while optimizing fat loss. Strength training is particularly important for building and maintaining muscle mass. Exercise helps reduce stress, increase energy, and improves fitness.  "Increasing exercise without diet control, however, may not burn enough calories to loose weight.       Start walking three days a week 10-20 minutes at a time    Work towards walking thirty minutes five days a week     Eventually, increase the speed of your walking for 1-2 minutes at time    In addition, we recommend that you review healthy lifestyles and methods for weight loss available through the National Institutes of Health patient information sites:  http://win.niddk.nih.gov/publications/index.htm    And look into health and wellness programs that may be available through your health insurance provider, employer, local community center, or gilberto club.    Weight management plan: Patient was referred to their PCP to discuss a diet and exercise plan.    MY TREATMENT INFORMATION FOR SLEEP APNEA-  Phan Eduardo    [facility]  [facility]EP [facility] :      MY CONTACT NUMBER:     Am I having a sleep study at a sleep center?  Make sure you have an appointment for the study before you leave!    Am I having a home sleep study?  Watch this video:  https://www.Codekko.com/watch?v=CteI_GhyP9g&list=[email or web address]    Frequently asked questions:  1. What is Obstructive Sleep Apnea (ROBERTH)? ROBERTH is the most common type of sleep apnea. Apnea means, \"without breath.\"  Apnea is most often caused by narrowing or collapse of the upper airway as muscles relax during sleep.   Almost everyone has occasional apneas. Most people with sleep apnea have had brief interruptions at night frequently for many years.  The severity of sleep apnea is related to how frequent and severe the events are.   2. What are the consequences of ROBERTH? Symptoms include: feeling sleepy during the day, snoring loudly, gasping or stopping of breathing, trouble sleeping, and occasionally morning headaches or heartburn at night.  Sleepiness can be serious and even increase the risk of falling asleep while driving. Other " health consequences may include development of high blood pressure and other cardiovascular disease in persons who are susceptible. Untreated ROBERTH  can contribute to heart disease, stroke and diabetes.   3. What are the treatment options? In most situations, sleep apnea is a lifelong disease that must be managed with daily therapy. Medications are not effective for sleep apnea and surgery is generally not considered until other therapies have been tried. Your treatment is your choice . Continuous Positive Airway (CPAP) works right away and is the therapy that is effective in nearly everyone. An oral device to hold your jaw forward is usually the next most reliable option. Other options include postioning devices (to keep you off your back), weight loss, and surgery including a tongue pacing device. There is more detail about some of these options below.    Important tips for using CPAP and similar devices   Know your equipment:  CPAP is continuous positive airway pressure that prevents obstructive sleep apnea by keeping the throat from collapsing while you are sleeping. In most cases, the device is  smart  and can slowly self-adjusts if your throat collapses and keeps a record every day of how well you are treated-this information is available to you and your care team.  BPAP is bilevel positive airway pressure that keeps your throat open and also assists each breath with a pressure boost to maintain adequate breathing.  Special kinds of BPAP are used in patients who have inadequate breathing from lung or heart disease. In most cases, the device is  smart  and can slowly self-adjusts to assist breathing. Like CPAP, the device keeps a record of how well you are treated.  Your mask is your connection to the device. You get to choose what feels most comfortable and the staff will help to make sure if fits. Here: are some examples of the different masks that are available:       Key points to remember on your journey  with sleep apnea:  1. Sleep study.  PAP devices often need to be adjusted during a sleep study to show that they are effective and adjusted right.  2. Good tips to remember: Try wearing just the mask during a quiet time during the day so your body adapts to wearing it. A humidifier is recommended for comfort in most cases to prevent drying of your nose and throat. Allergy medication from your provider may help you if you are having nasal congestion.  3. Getting settled-in. It takes more than one night for most of us to get used to wearing a mask. Try wearing just the mask during a quiet time during the day so your body adapts to wearing it. A humidifier is recommended for comfort in most cases. Our team will work with you carefully on the first day and will be in contact within 4 days and again at 2 and 4 weeks for advice and remote device adjustments. Your therapy is evaluated by the device each day.   4. Use it every night. The more you are able to sleep naturally for 7-8 hours, the more likely you will have good sleep and to prevent health risks or symptoms from sleep apnea. Even if you use it 4 hours it helps. Occasionally all of us are unable to use a medical therapy, in sleep apnea, it is not dangerous to miss one night.   5. Communicate. Call our skilled team on the number provided on the first day if your visit for problems that make it difficult to wear the device. Over 2 out of 3 patients can learn to wear the device long-term with help from our team. Remember to call our team or your sleep providers if you are unable to wear the device as we may have other solutions for those who cannot adapt to mask CPAP therapy. It is recommended that you sleep your sleep provider within the first 3 months and yearly after that if you are not having problems.   Take care of your equipment. Make sure you clean your mask and tubing using directions every day and that your filter and mask are replaced as recommended or if  they are not working.     BESIDES CPAP, WHAT OTHER THERAPIES ARE THERE?    Positioning Device  Positioning devices are generally used when sleep apnea is mild and only occurs on your back.This example shows a pillow that straps around the waist. It may be appropriate for those whose sleep study shows milder sleep apnea that occurs primarily when lying flat on one's back. Preliminary studies have shown benefit but effectiveness at home may need to be verified by a home sleep test. These devices are generally not covered by medical insurance.    Oral Appliance  What is oral appliance therapy?  An oral appliance is a small acrylic device that fits over the upper and lower teeth  (similar to a retainer or a mouth guard). This device slightly moves jaw forward, which moves the base of the tongue forward, opens the airway, improves breathing for effective treat snoring and obstructive sleep apnea in perhaps 7 out of 10 people .  The best working devices are custom-made by a dental device  after a mold is made of the teeth 1, 2, 3.  FOR MORE DETAILED INFORMATION SEE BELOW:  When is an oral appliance indicated?  Oral appliance therapy is recommended as a first-line treatment for patients with primary snoring, mild sleep apnea, and for patients with moderate sleep apnea who prefer appliance therapy to use of CPAP4, 5. Severity of sleep apnea is determined by sleep testing and is based on the number of respiratory events per hour of sleep.   How successful is oral appliance therapy?  The success rate of oral appliance therapy in patients with mild sleep apnea is 75-80% while in patients with moderate sleep apnea it is 50-70%. The chance of success in patients with severe sleep apnea is 40-50%. The research also shows that oral appliances have a beneficial effect on the cardiovascular health of ROBERTH patients at the same magnitude as CPAP therapy7.  Oral appliances should be a second-line treatment in cases of severe  sleep apnea, but if not completely successful then a combination therapy utilizing CPAP plus oral appliance therapy may be effective. Oral appliances tend to be effective in a broad range of patients although studies show that the patients who have the highest success are females, younger patients, those with milder disease, and less severe obesity. 3, 6.   The chances of success are lower in patients who have more severe ROBERTH, are older, and those who are morbidly obese.    Finding a dentist that practices dental sleep medicine  Specific training is available through the American Academy of Dental Sleep Medicine for dentists interested in working in the field of sleep. To find a dentist who is educated in the field of sleep and the use of oral appliances, near you, visit the Web site of the American Academy of Dental Sleep Medicine; also see   http://www.accpstorage.org/newOrganization/patients/oralAppliances.pdf  To search for a dentist certified in these practices:  Http://aadsm.org/FindADentist.aspx?1  1. Anjel, et al. Objectively measured vs self-reported compliance during oral appliance therapy for sleep-disordered breathing. Chest 2013; 144(5): 0822-5246.  2. Liza, et al. Objective measurement of compliance during oral appliance therapy for sleep-disordered breathing. Thorax 2013; 68(1): 91-96.  3. Mario et al. Mandibular advancement devices in 620 men and women with ROBERTH and snoring: tolerability and predictors of treatment success. Chest 2004; 125: 3050-5623.  4. Yaw, et al. Oral appliances for snoring and ROBERTH: a review. Sleep 2006; 29: 244-262.  5. Cathy et al. Oral appliance treatment for ROBERTH: an update. J Clin Sleep Med 2014; 10(2): 215-227.  6. Catrina, et al. Predictors of OSAH treatment outcome. J Dent Res 2007; 86: 8948-7677.      Weight Loss:    Weight management is a personal decision.  If you are interested in exploring weight loss strategies, the following discussion  covers the impact on weight loss on sleep apnea and the approaches that may be successful.    Weight loss decreases severity of sleep apnea in most people with obesity. For those with mild obesity who have developed snoring with weight gain, even 15-30 pound weight loss can improve and occasionally eliminate sleep apnea.  Structured and life-long dietary and health habits are necessary to lose weight and keep healthier weight levels.     Though there may be significant health benefits from weight loss, long-term weight loss is very difficult to achieve- studies show success with dietary management in less than 10% of people. In addition, substantial weight loss may require years of dietary control and may be difficult if patients have severe obesity. In these cases, surgical management may be considered.  Finally, older individuals who have tolerated obesity without health complications may be less likely to benefit from weight loss strategies.    Your BMI is Body mass index is 42.86 kg/(m^2).  Body mass index (BMI) is one way to tell whether you are at a healthy weight, overweight, or obese. It measures your weight in relation to your height.  A BMI of 18.5 to 24.9 is in the healthy range. A person with a BMI of 25 to 29.9 is considered overweight, and someone with a BMI of 30 or greater is considered obese. More than two-thirds of American adults are considered overweight or obese.  Being overweight or obese increases the risk for further weight gain. Excess weight may lead to heart disease and diabetes.  Creating and following plans for healthy eating and physical activity may help you improve your health.  Weight control is part of healthy lifestyle and includes exercise, emotional health, and healthy eating habits. Careful eating habits lifelong are the mainstay of weight control. Though there are significant health benefits from weight loss, long-term weight loss with diet alone may be very difficult to  achieve- studies show long-term success with dietary management in less than 10% of people. Attaining a healthy weight may be especially difficult to achieve in those with severe obesity. In some cases, medications, devices and surgical management might be considered.  What can you do?  If you are overweight or obese and are interested in methods for weight loss, you should discuss this with your provider.     Consider reducing daily calorie intake by 500 calories.     Keep a food journal.     Avoiding skipping meals, consider cutting portions instead.    Diet combined with exercise helps maintain muscle while optimizing fat loss. Strength training is particularly important for building and maintaining muscle mass. Exercise helps reduce stress, increase energy, and improves fitness. Increasing exercise without diet control, however, may not burn enough calories to loose weight.       Start walking three days a week 10-20 minutes at a time    Work towards walking thirty minutes five days a week     Eventually, increase the speed of your walking for 1-2 minutes at time    In addition, we recommend that you review healthy lifestyles and methods for weight loss available through the National Institutes of Health patient information sites:  http://win.niddk.nih.gov/publications/index.htm    And look into health and wellness programs that may be available through your health insurance provider, employer, local community center, or gilberto club.        Surgery:    Upper Airway Surgery for ROBERTH  Surgery for ROBERTH is a second-line treatment option in the management of sleep apnea.  Surgery should be considered for patients who are having a difficult time tolerating CPAP.    Surgery for ROBERTH is directed at areas that are responsible for narrowing or complete obstruction of the airway during sleep.  There are a wide range of procedures available to enlarge and/or stabilize the airway to prevent blockage of breathing in the three  major areas where it can occur: the palate, tongue, and nasal regions.  Successful surgical treatment depends on the accurate identification of the factors responsible for obstructive sleep apnea in each person.  A personalized approach is required because there is no single treatment that works well for everyone.  Because of anatomic variation, consultation with an examination by a sleep surgeon is a critical first step in determining what surgical options are best for each patient.  In some cases, examination during sedation may be recommended in order to guide the selection of procedures.  Patients will be counseled about risks and benefits as well as the typical recovery course after surgery. Surgery is typically not a cure for a person s ROBERTH.  However, surgery will often significantly improve one s ROBERTH severity (termed  success rate ).  Even in the absence of a cure, surgery will decrease the cardiovascular risk associated with OSA7; improve overall quality of life8 (sleepiness, functionality, sleep quality, etc).      Palate Procedures:  Patients with ROBERTH often have narrowing of their airway in the region of their tonsils and uvula.  The goals of palate procedures are to widen the airway in this region as well as to help the tissues resist collapse.  Modern palate procedure techniques focus on tissue conservation and soft tissue rearrangement, rather than tissue removal.  Often the uvula is preserved in this procedure. Residual sleep apnea is common in patient after pharyngoplasty with an average reduction in sleep apnea events of 33%2.      Tongue Procedures:  ExamWhile patients are awake, the muscles that surround the throat are active and keep this region open for breathing. These muscles relax during sleep, allowing the tongue and other structures to collapse and block breathing.  There are several different tongue procedures available.  Selection of a tongue base procedure depends on characteristics seen  on physical exam.  Generally, procedures are aimed at removing bulky tissues in this area or preventing the back of the tongue from falling back during sleep.  Success rates for tongue surgery range from 50-62%3.    Hypoglossal Nerve Stimulation:  Hypoglossal nerve stimulation has recently received approval from the [place] Food and Drug Administration for the treatment of obstructive sleep apnea.  This is based on research showing that the system was safe and effective in treating sleep apnea6.  Results showed that the median AHI score decreased 68%, from 29.3 to 9.0. This therapy uses an implant system that senses breathing patterns and delivers mild stimulation to airway muscles, which keeps the airway open during sleep.  The system consists of three fully implanted components: a small generator (similar in size to a pacemaker), a breathing sensor, and a stimulation lead.  Using a small handheld remote, a patient turns the therapy on before bed and off upon awakening.    Candidates for this device must be greater than 22 years of age, have moderate to severe ROBERTH (AHI between 20-65), BMI less than 32, have tried CPAP/oral appliance without success, and have appropriate upper airway anatomy (determined by a sleep endoscopy performed by Dr. Padilla).    Hypoglossal Nerve Stimulation Pathway:    The sleep surgeon s office will work with the patient through the insurance prior-authorization process (including communications and appeals).    Nasal Procedures:  Nasal obstruction can interfere with nasal breathing during the day and night.  Studies have shown that relief of nasal obstruction can improve the ability of some patients to tolerate positive airway pressure therapy for obstructive sleep apnea1.  Treatment options include medications such as nasal saline, topical corticosteroid and antihistamine sprays, and oral medications such as antihistamines or decongestants. Non-surgical treatments can include external  nasal dilators for selected patients. If these are not successful by themselves, surgery can improve the nasal airway either alone or in combination with these other options.      Combination Procedures:  Combination of surgical procedures and other treatments may be recommended, particularly if patients have more than one area of narrowing or persistent positional disease.  The success rate of combination surgery ranges from 66-80%2,3.      1. Rafi MCKEON. The Role of the Nose in Snoring and Obstructive Sleep Apnoea: An Update.  Eur Arch Otorhinolaryngol. 2011; 268: 1365-73.  2.  Jody SM; Jessica JA; Neeraj JR; Pallanch JF; Callie MB; Bhavani SG; Chino FRENCH. Surgical modifications of the upper airway for obstructive sleep apnea in adults: a systematic review and meta-analysis. SLEEP 2010;33(10):3005-9232. Jaqueline BENOIT. Hypopharyngeal surgery in obstructive sleep apnea: an evidence-based medicine review.  Arch Otolaryngol Head Neck Surg. 2006 Feb;132(2):206-13.  3. Mark YH1, Mack Y, Ollie MANASA. The efficacy of anatomically based multilevel surgery for obstructive sleep apnea. Otolaryngol Head Neck Surg. 2003 Oct;129(4):327-35.  4. Jaqueline BENOIT, Goldberg A. Hypopharyngeal Surgery in Obstructive Sleep Apnea: An Evidence-Based Medicine Review. Arch Otolaryngol Head Neck Surg. 2006 Feb;132(2):206-13.  5. Kathy PJ et al. Upper-Airway Stimulation for Obstructive Sleep Apnea.  N Engl J Med. 2014 Jan 9;370(2):139-49.  6. Maria Ines Y et al. Increased Incidence of Cardiovascular Disease in Middle-aged Men with Obstructive Sleep Apnea. Am J Respir Crit Care Med; 2002 166: 159-165  7. Ibrahim EM et al. Studying Life Effects and Effectiveness of Palatopharyngoplasty (SLEEP) study: Subjective Outcomes of Isolated Uvulopalatopharyngoplasty. Otolaryngol Head Neck Surg. 2011; 144: 623-631.      For his dream enactment behaviors, it was discussed that this could be due to confusional arousal, medications, or RBD. RBD is a neurophysiological  substrate for some neurodegenerative disorders like alpha synucleinopathies (Parkinsons disease, Dementia of lewy body and Multisystem Atrophy).  Counseled the patient about association with synucleinopathies and explained that it can also be aggravated by certain drugs like antidepressants.  Recommend he undergo neurological surveillance yearly for development of neurological signs that commensurate with a neurodegenerative disorder. He should also keep his bedroom environment safe - no weapons in the bedroom, padding on the floor, keep sharp objects away from the bed, etc.    Physical safeguards. Sleep and home  environmental safety is important  /keeping the sleep environment safer, including padding the floor near the bed, placing barriers on the side of the bed and moving the bed away from the window, potentially dangerous objects should be removed from the bedroom.    Please do not drive/operate heavy machinery  if drowsy or sleepy.

## 2017-05-10 ENCOUNTER — THERAPY VISIT (OUTPATIENT)
Dept: SLEEP MEDICINE | Facility: CLINIC | Age: 52
End: 2017-05-10
Attending: INTERNAL MEDICINE
Payer: COMMERCIAL

## 2017-05-10 DIAGNOSIS — G47.33 OSA (OBSTRUCTIVE SLEEP APNEA): ICD-10-CM

## 2017-05-10 LAB
BASE EXCESS BLDA CALC-SCNC: 1.1 MMOL/L
HCO3 BLD-SCNC: 25 MMOL/L (ref 21–28)
O2/TOTAL GAS SETTING VFR VENT: 21 %
PCO2 BLD: 36 MM HG (ref 35–45)
PH BLD: 7.45 PH (ref 7.35–7.45)
PO2 BLD: 57 MM HG (ref 80–105)

## 2017-05-10 PROCEDURE — 36600 WITHDRAWAL OF ARTERIAL BLOOD: CPT | Mod: ZF

## 2017-05-10 PROCEDURE — 82803 BLOOD GASES ANY COMBINATION: CPT | Performed by: INTERNAL MEDICINE

## 2017-05-10 PROCEDURE — 95811 POLYSOM 6/>YRS CPAP 4/> PARM: CPT | Mod: ZF | Performed by: INTERNAL MEDICINE

## 2017-05-10 NOTE — MR AVS SNAPSHOT
After Visit Summary   5/10/2017    Phan Eduardo    MRN: [id number]           Patient Information     Date Of Birth          1965        Visit Information        Provider Department      5/10/2017 8:00 PM SLEEP STUDY RM 5 [facility], Sleep Study        Today's Diagnoses     ROBERTH (obstructive sleep apnea)          Care Instructions    [facility]    1. Your sleep study will be reviewed by a sleep physician within the next few days.     2. Please follow up in the sleep clinic as scheduled, or, make an appointment with your sleep provider to be seen within two weeks to discuss the results of the sleep study.    3. If you have any questions or problems with your treatment plan, please contact your sleep clinic provider at [phone] to further manage your condition.    4. Please review your attached medication list, and, at your follow-up appointment advise your sleep clinic provider about any changes.    5. Go to [email or web address] for more information about your sleep problems.    MOSHE Croft  May 10, 2017              Follow-ups after your visit        Your next 10 appointments already scheduled     May 18, 2017   Procedure with Amy Gilbert MD   [facility], Endoscopy ([facility])    [address] [facility] [id number] [phone]           The [facility] is located on the corner of [address] and [address] on the [facility]. It is easily accessible from virtually any point in the [place] area, via [address] and [address].            May 30, 2017  9:30 AM CDT   Return Sleep Patient with aNrcisa Moe MD   [facility], Sleep Study ([facility])    [address] [place] [id number] [phone]              Who to contact     If you have questions or need  "follow up information about today's clinic visit or your schedule please contact [facility] directly at [phone].  Normal or non-critical lab and imaging results will be communicated to you by [employer], letter or phone within 4 business days after the clinic has received the results. If you do not hear from us within 7 days, please contact the clinic through [employer] or phone. If you have a critical or abnormal lab result, we will notify you by phone as soon as possible.  Submit refill requests through [employer] or call your pharmacy and they will forward the refill request to us. Please allow 3 business days for your refill to be completed.          Additional Information About Your Visit        [employer] Information     RoomClip lets you send messages to your doctor, view your test results, renew your prescriptions, schedule appointments and more. To sign up, go to [email or web address] . Click on \"Log in\" on the left side of the screen, which will take you to the Welcome page. Then click on \"Sign up Now\" on the right side of the page.     You will be asked to enter the access code listed below, as well as some personal information. Please follow the directions to create your username and password.     Your access code is: [email or web address]  Expires: 2017  2:12 PM     Your access code will  in 90 days. If you need help or a new code, please call your [place] clinic or [phone].        Care EveryWhere ID     This is your Care EveryWhere ID. This could be used by other organizations to access your [facility] medical records  [id number]         Blood Pressure from Last 3 Encounters:   17 (!) 128/94   17 114/77   17 114/74    Weight from Last 3 Encounters:   17 (!) 137.4 kg (303 lb)   17 135.9 kg (299 lb 9.6 oz)   17 (!) 138.5 kg (305 lb 6.4 oz)              We Performed the Following     Blood gas arterial     Comprehensive Sleep Study        Primary Care " Provider Office Phone # Fax #    PINKY Acevedo [phone]       [facility] [address] [facility] [address]        Thank you!     [facility]  [facility]. Our goal is always to provide you with excellent care. Hearing back from our patients is one way we can continue to improve our services. Please take a few minutes to complete the written survey that you may receive in the mail after your visit with us. Thank you!             Your Updated Medication List - Protect others around you: Learn how to safely use, store and throw away your medicines at www.disposemymeds.org.          This list is accurate as of: 5/10/17 10:08 PM.  Always use your most recent med list.                   Brand Name Dispense Instructions for use    albuterol 108 (90 BASE) MCG/ACT Inhaler    PROAIR HFA/PROVENTIL HFA/VENTOLIN HFA     Inhale 2 puffs into the lungs every 6 hours       amphetamine-dextroamphetamine 30 MG per 24 hr capsule    ADDERALL XR    30 capsule    Take 1 capsule (30 mg) by mouth daily       atenolol 25 MG tablet    TENORMIN    30 tablet    Take 1 tablet (25 mg) by mouth daily       cetirizine 10 MG tablet    zyrTEC    30 tablet    Take 1 tablet (10 mg) by mouth every evening       fluticasone 50 MCG/BLIST Aepb    FLOVENT DISKUS     Inhale 1 puff into the lungs every 12 hours       hydrochlorothiazide 25 MG tablet    HYDRODIURIL    30 tablet    Take 1 tablet (25 mg) by mouth daily       levothyroxine 175 MCG tablet    SYNTHROID/LEVOTHROID    30 tablet    Take 1 tablet (175 mcg) by mouth daily       nicotine polacrilex 4 MG lozenge    COMMIT    360 tablet    Place 1 lozenge (4 mg) inside cheek as needed for smoking cessation (every 2-3 hours as needed for cravings)       omeprazole 20 MG CR capsule    priLOSEC    30 capsule    Take 1 capsule (20 mg) by mouth daily

## 2017-05-11 ENCOUNTER — TELEPHONE (OUTPATIENT)
Dept: GASTROENTEROLOGY | Facility: CLINIC | Age: 52
End: 2017-05-11

## 2017-05-11 DIAGNOSIS — Z12.11 ENCOUNTER FOR SCREENING COLONOSCOPY: Primary | ICD-10-CM

## 2017-05-11 NOTE — TELEPHONE ENCOUNTER
Patient scheduled for colonoscopy    Indication for procedure. screening    Referring Provider. Dr. Zepeda    ? No    Arrival time verified? 1:00    Facility location verified? Yes, [address]    Instructions given regarding prep and procedure    Prep Type golytley    Are you taking any anticoagulants or blood thinners? No    Instructions given? No    Electronic implanted devices? no    Pre procedure teaching completed? Yes    Transportation from procedure? Yes, is at treatment facility with observation by staff    H&P / Pre op physical completed? Yes    Michelle Roldan RN

## 2017-05-11 NOTE — PATIENT INSTRUCTIONS
[facility]    1. Your sleep study will be reviewed by a sleep physician within the next few days.     2. Please follow up in the sleep clinic as scheduled, or, make an appointment with your sleep provider to be seen within two weeks to discuss the results of the sleep study.    3. If you have any questions or problems with your treatment plan, please contact your sleep clinic provider at [phone] to further manage your condition.    4. Please review your attached medication list, and, at your follow-up appointment advise your sleep clinic provider about any changes.    5. Go to [email or web address] for more information about your sleep problems.    Cee Lawson, MOSHEGT  May 10, 2017

## 2017-05-11 NOTE — PROGRESS NOTES
Completed a split night PSG per provider order.    Preliminary AHI 96.  A final therapeutic PAP pressure was achieved.    Supine REM was not seen on therapeutic pressure.    Patient reports feeling refreshed in AM.    RT in sleep lab completed ABG draw.

## 2017-05-12 ENCOUNTER — TELEPHONE (OUTPATIENT)
Dept: SLEEP MEDICINE | Facility: CLINIC | Age: 52
End: 2017-05-12

## 2017-05-12 DIAGNOSIS — G47.33 OSA (OBSTRUCTIVE SLEEP APNEA): Primary | ICD-10-CM

## 2017-05-12 NOTE — TELEPHONE ENCOUNTER
I called pt to review PSG results with him.    Severe ROBERTH.    Pt poorly tolerated CPAP, was switched to Bi-level and did well during lateral sleep with marked improvement in sleep consolidation.       Pt very eager to get set up.  Will place ordered for 18/8, urgent set up.    Encouraged pt to sleep lateral and to communicate any issues to DME and STM coaches as they arise,    Pt should follow up with primary sleep MDLuis Felipe.  Itzel Roy M.D.  Pulmonary/Critical Care/Sleep Medicine

## 2017-05-18 ENCOUNTER — SURGERY (OUTPATIENT)
Age: 52
End: 2017-05-18

## 2017-05-18 ENCOUNTER — HOSPITAL ENCOUNTER (OUTPATIENT)
Facility: CLINIC | Age: 52
Discharge: HOME | End: 2017-05-18
Attending: INTERNAL MEDICINE | Admitting: INTERNAL MEDICINE
Payer: COMMERCIAL

## 2017-05-18 ENCOUNTER — ANESTHESIA (OUTPATIENT)
Dept: GASTROENTEROLOGY | Facility: CLINIC | Age: 52
End: 2017-05-18
Payer: COMMERCIAL

## 2017-05-18 ENCOUNTER — ANESTHESIA EVENT (OUTPATIENT)
Dept: GASTROENTEROLOGY | Facility: CLINIC | Age: 52
End: 2017-05-18
Payer: COMMERCIAL

## 2017-05-18 VITALS
HEIGHT: 71 IN | DIASTOLIC BLOOD PRESSURE: 68 MMHG | BODY MASS INDEX: 41.75 KG/M2 | RESPIRATION RATE: 13 BRPM | SYSTOLIC BLOOD PRESSURE: 115 MMHG | OXYGEN SATURATION: 93 % | TEMPERATURE: 97.9 F | WEIGHT: 298.2 LBS | HEART RATE: 70 BPM

## 2017-05-18 LAB — COLONOSCOPY: NORMAL

## 2017-05-18 PROCEDURE — 25000125 ZZHC RX 250: Performed by: NURSE ANESTHETIST, CERTIFIED REGISTERED

## 2017-05-18 PROCEDURE — 37000009 ZZH ANESTHESIA TECHNICAL FEE, EACH ADDTL 15 MIN: Performed by: INTERNAL MEDICINE

## 2017-05-18 PROCEDURE — 25000128 H RX IP 250 OP 636: Performed by: NURSE ANESTHETIST, CERTIFIED REGISTERED

## 2017-05-18 PROCEDURE — 25000128 H RX IP 250 OP 636: Performed by: INTERNAL MEDICINE

## 2017-05-18 PROCEDURE — 37000008 ZZH ANESTHESIA TECHNICAL FEE, 1ST 30 MIN: Performed by: INTERNAL MEDICINE

## 2017-05-18 PROCEDURE — 45380 COLONOSCOPY AND BIOPSY: CPT | Mod: XU,PT | Performed by: INTERNAL MEDICINE

## 2017-05-18 PROCEDURE — 88305 TISSUE EXAM BY PATHOLOGIST: CPT | Performed by: INTERNAL MEDICINE

## 2017-05-18 PROCEDURE — 45385 COLONOSCOPY W/LESION REMOVAL: CPT | Performed by: INTERNAL MEDICINE

## 2017-05-18 RX ORDER — ONDANSETRON 2 MG/ML
4 INJECTION INTRAMUSCULAR; INTRAVENOUS
Status: COMPLETED | OUTPATIENT
Start: 2017-05-18 | End: 2017-05-18

## 2017-05-18 RX ORDER — PROPOFOL 10 MG/ML
INJECTION, EMULSION INTRAVENOUS CONTINUOUS PRN
Status: DISCONTINUED | OUTPATIENT
Start: 2017-05-18 | End: 2017-05-18

## 2017-05-18 RX ORDER — FENTANYL CITRATE 50 UG/ML
INJECTION, SOLUTION INTRAMUSCULAR; INTRAVENOUS PRN
Status: DISCONTINUED | OUTPATIENT
Start: 2017-05-18 | End: 2017-05-18

## 2017-05-18 RX ORDER — LIDOCAINE HYDROCHLORIDE 20 MG/ML
INJECTION, SOLUTION INFILTRATION; PERINEURAL PRN
Status: DISCONTINUED | OUTPATIENT
Start: 2017-05-18 | End: 2017-05-18

## 2017-05-18 RX ORDER — SODIUM CHLORIDE, SODIUM LACTATE, POTASSIUM CHLORIDE, CALCIUM CHLORIDE 600; 310; 30; 20 MG/100ML; MG/100ML; MG/100ML; MG/100ML
INJECTION, SOLUTION INTRAVENOUS CONTINUOUS PRN
Status: DISCONTINUED | OUTPATIENT
Start: 2017-05-18 | End: 2017-05-18

## 2017-05-18 RX ADMIN — PROPOFOL 100 MCG/KG/MIN: 10 INJECTION, EMULSION INTRAVENOUS at 12:13

## 2017-05-18 RX ADMIN — MIDAZOLAM HYDROCHLORIDE 2 MG: 1 INJECTION, SOLUTION INTRAMUSCULAR; INTRAVENOUS at 12:13

## 2017-05-18 RX ADMIN — FENTANYL CITRATE 25 MCG: 50 INJECTION, SOLUTION INTRAMUSCULAR; INTRAVENOUS at 12:13

## 2017-05-18 RX ADMIN — FENTANYL CITRATE 25 MCG: 50 INJECTION, SOLUTION INTRAMUSCULAR; INTRAVENOUS at 12:18

## 2017-05-18 RX ADMIN — FENTANYL CITRATE 25 MCG: 50 INJECTION, SOLUTION INTRAMUSCULAR; INTRAVENOUS at 12:24

## 2017-05-18 RX ADMIN — SODIUM CHLORIDE, POTASSIUM CHLORIDE, SODIUM LACTATE AND CALCIUM CHLORIDE: 600; 310; 30; 20 INJECTION, SOLUTION INTRAVENOUS at 12:10

## 2017-05-18 RX ADMIN — ONDANSETRON 4 MG: 2 INJECTION INTRAMUSCULAR; INTRAVENOUS at 12:41

## 2017-05-18 RX ADMIN — LIDOCAINE HYDROCHLORIDE 20 MG: 20 INJECTION, SOLUTION INFILTRATION; PERINEURAL at 12:13

## 2017-05-18 ASSESSMENT — LIFESTYLE VARIABLES: TOBACCO_USE: 1

## 2017-05-18 NOTE — OR NURSING
Colonoscopy with multiple polyps removed via ERBE hot snare (25 watt, forced coag, 2 effect), cold snare and biopsy forceps.  Pt tolerated well with MAC.

## 2017-05-18 NOTE — ANESTHESIA POSTPROCEDURE EVALUATION
Patient: Phan Eduardo    Procedure(s):  Screening Colonoscopy - Wound Class: II-Clean Contaminated   - Wound Class: II-Clean Contaminated    Diagnosis:Screening Colonoscopy  Diagnosis Additional Information: No value filed.    Anesthesia Type:  MAC    Note:  Anesthesia Post Evaluation    Patient location during evaluation: Endoscopy Recovery  Patient participation: Able to fully participate in evaluation  Level of consciousness: awake  Pain management: adequate  Airway patency: patent  Cardiovascular status: acceptable  Respiratory status: acceptable  Hydration status: acceptable  PONV: none     Anesthetic complications: None          Last vitals:  Vitals:    05/18/17 1140   BP: 119/83   Resp: 16   SpO2: 94%         Electronically Signed By: Berry Nelson MD  May 18, 2017  12:55 PM

## 2017-05-18 NOTE — DISCHARGE INSTRUCTIONS
[facility] Colonoscopy/Flexible Sigmoidoscopy with Monitored Anesthesia Care  For 24 hours after your procedure  Sedation:  1. Get plenty of rest. A responsible adult must stay with you for at least 24 hours after you leave the hospital.   2. Do not drive or use heavy equipment. If you have weakness or tingling, don't drive or use heavy equipment until this feeling goes away.  3. Do not drink alcohol.  4. Avoid strenuous or risky activities. Ask for help when climbing stairs.   5. You may feel lightheaded. IF so, sit for a few minutes before standing. Have someone help you get up.   6. If you have nausea (feel sick to your stomach): Drink only clear liquids such as apple juice, ginger ale, broth or 7-Up. Rest may also help. Be sure to drink enough fluids. Move to a regular diet as you feel able.  7. You may have a slight fever. Call the doctor if your fever is over 100 F (37.7 C) (taken under the tongue) or lasts longer than 24 hours.  8. You may have a dry mouth, a sore throat, muscle aches or trouble sleeping. These should go away after 24 hours.  9. Do not make important or legal decisions.   Procedural:  1. You may return to your normal diet and medicines.  2. Air was placed in your colon during the exam in order to see it. Walking helps to pass the air.  3. You may take Tylenol (acetaminophen) for pain unless your doctor has told you not to.   Do not take aspirin or ibuprofen (Advil, Motrin, or other anti-inflammatory  drugs) for _____ days.  Call your doctor for any of the following  1. Unusual pain in belly or chest pain not relieved with passing air.  2. More than 1 to 2 Tablespoons of bleeding from your rectum.  3.   4. It has been over 8 to 10 hours since surgery and you are still not able to urinate (pass water).  5. Headache for over 24 hours.  __x__ We took small tissue samples or polyps to study. Your doctor will call you with the results within two weeks.  If you have severe pain, bleeding, or shortness of  breath, go to an emergency room.  If you have questions, call:  Endoscopy: Monday to Friday, 7 a.m. to 4:30 p.m. [phone] (We may have to call you back)  After hours: Hospital  [phone] (Ask for the GI fellow on call)

## 2017-05-18 NOTE — IP AVS SNAPSHOT
MRN:[id number]                      After Visit Summary   5/18/2017    Phan Eduardo    MRN: [id number]           Thank you!     Thank you for choosing [facility] for your care. Our goal is always to provide you with excellent care. Hearing back from our patients is one way we can continue to improve our services. Please take a few minutes to complete the written survey that you may receive in the mail after you visit with us. Thank you!        Patient Information     Date Of Birth          1965        About your hospital stay     You were admitted on:  May 18, 2017 You last received care in the:  [facility], Endoscopy    You were discharged on:  May 18, 2017       Who to Call     For medical emergencies, please call 911.  For non-urgent questions about your medical care, please call your primary care provider or clinic, [phone]  For questions related to your surgery, please call your surgery clinic        Attending Provider     Provider Amy Pabon MD Internal Medicine       Primary Care Provider Office Phone # Fax #    Kassy PINKY Murry [facility] [phone]       [facility] [address] [facility] [address]        Your next 10 appointments already scheduled     May 30, 2017  9:30 AM CDT   Return Sleep Patient with Narcisa Moe MD   [facility], Sleep Study ([facility])    [address] [place] [id number] [phone]              Further instructions from your care team       [facility] Colonoscopy/Flexible Sigmoidoscopy with Monitored Anesthesia Care  For 24 hours after your procedure  Sedation:  1. Get plenty of rest. A responsible adult must stay with you for at least 24 hours after you leave the hospital.   2. Do not drive or use heavy equipment. If you have weakness or tingling, don't drive or use heavy equipment until this feeling goes  away.  3. Do not drink alcohol.  4. Avoid strenuous or risky activities. Ask for help when climbing stairs.   5. You may feel lightheaded. IF so, sit for a few minutes before standing. Have someone help you get up.   6. If you have nausea (feel sick to your stomach): Drink only clear liquids such as apple juice, ginger ale, broth or 7-Up. Rest may also help. Be sure to drink enough fluids. Move to a regular diet as you feel able.  7. You may have a slight fever. Call the doctor if your fever is over 100 F (37.7 C) (taken under the tongue) or lasts longer than 24 hours.  8. You may have a dry mouth, a sore throat, muscle aches or trouble sleeping. These should go away after 24 hours.  9. Do not make important or legal decisions.   Procedural:  1. You may return to your normal diet and medicines.  2. Air was placed in your colon during the exam in order to see it. Walking helps to pass the air.  3. You may take Tylenol (acetaminophen) for pain unless your doctor has told you not to.   Do not take aspirin or ibuprofen (Advil, Motrin, or other anti-inflammatory  drugs) for _____ days.  Call your doctor for any of the following  1. Unusual pain in belly or chest pain not relieved with passing air.  2. More than 1 to 2 Tablespoons of bleeding from your rectum.  3.   4. It has been over 8 to 10 hours since surgery and you are still not able to urinate (pass water).  5. Headache for over 24 hours.  __x__ We took small tissue samples or polyps to study. Your doctor will call you with the results within two weeks.  If you have severe pain, bleeding, or shortness of breath, go to an emergency room.  If you have questions, call:  Endoscopy: Monday to Friday, 7 a.m. to 4:30 p.m. [phone] (We may have to call you back)  After hours: Hospital  [phone] (Ask for the GI fellow on call)      Pending Results     No orders found from 5/16/2017 to 5/19/2017.            Admission Information     Date & Time Provider  "Department Dept. Phone    2017 Amy Gilbert MD [facility] [place], Endoscopy [phone]      Your Vitals Were     Blood Pressure Respirations Height Weight Pulse Oximetry BMI (Body Mass Index)    119/83 16 1.791 m (5' 10.5\") 135.3 kg (298 lb 3.2 oz) 94% 42.18 kg/m2      MyChart Information     [employer] lets you send messages to your doctor, view your test results, renew your prescriptions, schedule appointments and more. To sign up, go to [email or web address] . Click on \"Log in\" on the left side of the screen, which will take you to the Welcome page. Then click on \"Sign up Now\" on the right side of the page.     You will be asked to enter the access code listed below, as well as some personal information. Please follow the directions to create your username and password.     Your access code is: [email or web address]  Expires: 2017  2:12 PM     Your access code will  in 90 days. If you need help or a new code, please call your [place] clinic or [phone].        Care EveryWhere ID     This is your Care EveryWhere ID. This could be used by other organizations to access your [place] medical records  [id number]           Review of your medicines      UNREVIEWED medicines. Ask your doctor about these medicines        Dose / Directions    amphetamine-dextroamphetamine 30 MG per 24 hr capsule   Commonly known as:  ADDERALL XR   Used for:  ADD (attention deficit disorder)        Dose:  30 mg   Take 1 capsule (30 mg) by mouth daily   Quantity:  30 capsule   Refills:  0       atenolol 25 MG tablet   Commonly known as:  TENORMIN   Used for:  Essential hypertension, benign        Dose:  25 mg   Take 1 tablet (25 mg) by mouth daily   Quantity:  30 tablet   Refills:  6       cetirizine 10 MG tablet   Commonly known as:  zyrTEC   Used for:  Dysfunction of eustachian tube, bilateral        Dose:  10 mg   Take 1 tablet (10 mg) by mouth every evening   Quantity:  30 tablet   Refills:  6       " hydrochlorothiazide 25 MG tablet   Commonly known as:  HYDRODIURIL   Used for:  Essential hypertension, benign        Dose:  25 mg   Take 1 tablet (25 mg) by mouth daily   Quantity:  30 tablet   Refills:  6       levothyroxine 175 MCG tablet   Commonly known as:  SYNTHROID/LEVOTHROID   Used for:  Hypothyroidism, unspecified type        Dose:  175 mcg   Take 1 tablet (175 mcg) by mouth daily   Quantity:  30 tablet   Refills:  6       nicotine polacrilex 4 MG lozenge   Commonly known as:  COMMIT   Used for:  Tobacco abuse        Dose:  4 mg   Place 1 lozenge (4 mg) inside cheek as needed for smoking cessation (every 2-3 hours as needed for cravings)   Quantity:  360 tablet   Refills:  1       omeprazole 20 MG CR capsule   Commonly known as:  priLOSEC   Used for:  Gastroesophageal reflux disease, esophagitis presence not specified        Dose:  20 mg   Take 1 capsule (20 mg) by mouth daily   Quantity:  30 capsule   Refills:  3                Protect others around you: Learn how to safely use, store and throw away your medicines at www.disposemymeds.org.             Medication List: This is a list of all your medications and when to take them. Check marks below indicate your daily home schedule. Keep this list as a reference.      Medications           Morning Afternoon Evening Bedtime As Needed    amphetamine-dextroamphetamine 30 MG per 24 hr capsule   Commonly known as:  ADDERALL XR   Take 1 capsule (30 mg) by mouth daily                                atenolol 25 MG tablet   Commonly known as:  TENORMIN   Take 1 tablet (25 mg) by mouth daily                                cetirizine 10 MG tablet   Commonly known as:  zyrTEC   Take 1 tablet (10 mg) by mouth every evening                                hydrochlorothiazide 25 MG tablet   Commonly known as:  HYDRODIURIL   Take 1 tablet (25 mg) by mouth daily                                levothyroxine 175 MCG tablet   Commonly known as:  SYNTHROID/LEVOTHROID   Take 1  tablet (175 mcg) by mouth daily                                nicotine polacrilex 4 MG lozenge   Commonly known as:  COMMIT   Place 1 lozenge (4 mg) inside cheek as needed for smoking cessation (every 2-3 hours as needed for cravings)                                omeprazole 20 MG CR capsule   Commonly known as:  priLOSEC   Take 1 capsule (20 mg) by mouth daily

## 2017-05-18 NOTE — ANESTHESIA CARE TRANSFER NOTE
Patient: Phan Eduardo    Procedure(s):  Screening Colonoscopy - Wound Class: II-Clean Contaminated   - Wound Class: II-Clean Contaminated    Diagnosis: Screening Colonoscopy  Diagnosis Additional Information: No value filed.    Anesthesia Type:   MAC     Note:  Airway :Room Air  Patient transferred to:Phase II  Comments: VSS on arrival, report to RN.       Vitals: (Last set prior to Anesthesia Care Transfer)    CRNA VITALS  5/18/2017 1218 - 5/18/2017 1255      5/18/2017             NIBP: 100/75    Pulse: 72    SpO2: 96 %     on RA                Electronically Signed By: PINKY Berrios CRNA  May 18, 2017  12:55 PM

## 2017-05-18 NOTE — ANESTHESIA PREPROCEDURE EVALUATION
Anesthesia Evaluation     . Pt has had prior anesthetic. Type: General    No history of anesthetic complications          ROS/MED HX    ENT/Pulmonary:     (+)sleep apnea, tobacco use, Current use , . .    Neurologic:       Cardiovascular:     (+) hypertension----. : . . . :. .       METS/Exercise Tolerance:     Hematologic:         Musculoskeletal:         GI/Hepatic:         Renal/Genitourinary:         Endo:     (+) thyroid problem Obesity, .      Psychiatric:         Infectious Disease:         Malignancy:         Other:                     Physical Exam  Normal systems: cardiovascular and pulmonary    Airway   Mallampati: II  TM distance: >3 FB  Neck ROM: full    Dental   (+) chipped    Cardiovascular       Pulmonary                     Anesthesia Plan      History & Physical Review  History and physical reviewed and following examination; no interval change.    ASA Status:  3 .    NPO Status:  > 8 hours    Plan for MAC with Intravenous induction. Maintenance will be TIVA.  Reason for MAC:  Deep or markedly invasive procedure (G8)         Postoperative Care  Postoperative pain management:  IV analgesics.      Consents  Anesthetic plan, risks, benefits and alternatives discussed with:  Patient.  Use of blood products discussed: No .   .

## 2017-05-18 NOTE — LETTER
May 22, 2017       TO: Janet Eduardo  [address] [id number]       Dear Mr. Eduardo,    We are writing to inform you of your test results.    Repeat colonoscopy in 1 yr as we discussed.     The polyp tissue removed from your colon showed no evidence of cancer, but was identified as adenomatous.  These types of polyps can progress to cancer if left in the colon.    Although your polyp tissue was completely removed, we know that you may develop more polyps in the future.  The results and recommendations regarding a follow-up colonoscopy were sent to your primary physician.  He or she will review our recommendation in the context of your other medical problems.  Please remember that our recommendation is only for individuals who have no symptoms.  If you experience a persistent change in bowel habits, or develop new abdominal pain or bleeding in your stools, please consult your primary care physician.    If you have questions, please feel free to make an appointment with your primary care physician or gastroenterology clinic.      Resulted Orders   Surgical pathology exam   Result Value Ref Range    Copath Report       Patient Name: JANET EDUARDO  MR#: [id number]  Specimen #: [id number]  Collected: 5/18/2017  Received: 5/18/2017  Reported: 5/19/2017 14:42  Ordering Phy(s): ERNST YOUNG    For improved result formatting, select 'View Enhanced Report Format'  under Linked Documents section.    SPECIMEN(S):  A: Cecal polyp  B: Colon polyps, ascending  C: Colon polyp, transverse  D: Sigmoid colon polyp  E: Rectal polyp    FINAL DIAGNOSIS:  A: Large intestine, cecum, polypectomy  - Tubular adenoma  - No evidence of high-grade dysplasia or invasive malignancy    B: Large intestine, ascending colon, polypectomy x3  - 8 fragments of Sessile serrated adenoma  - No evidence of cytological dysplasia or invasive malignancy    C: Large intestine, transverse colon, polypectomy  - Tubular  "adenoma  - No evidence of high-grade dysplasia or invasive malignancy    D: Large intestine, sigmoid colon, polypectomy  - Tubular adenoma  - No evidence of high-grade dysplasia or invasive malignancy    E: Large inte pauline, rectum, polypectomy x4  - Hyperplastic polyp x4  - No evidence of neoplastic polyp or malignancy    I have personally reviewed all specimens and or slides, including the  listed special stains, and used them with my medical judgement to  determine the final diagnosis.    Electronically signed out by:    Bhupendra Mcginnis M.D., [facility]    CLINICAL HISTORY:  Screening    GROSS:  A: The specimen is received in formalin with proper patient  identification, labeled \"cecum polyp\".  The specimen consists of two  red-brown soft tissue fragments measuring 0.1 and 0.4 cm in greatest  dimension, which are entirely submitted in cassette A1.    B: The specimen is received in formalin with proper patient  identification, labeled \"ascending colon polyps x3\".  The specimen  consists of eight red-brown soft tissue fragments ranging in size from  less than 0.1-0.2 cylinders in greatest dimension, which are entirely  submitted in cassette B1.    C: The specimen is received in formalin with prope r patient  identification, labeled \"transverse colon polyp\".  The specimen consists  of a 0.3 x 0.2 x 0.1 cm red-brown soft tissue fragment, which is  entirely submitted in cassette C1.    D: The specimen is received in formalin with proper patient  identification, labeled \"sigmoid colon\".  The specimen consists of a 0.4  x 0.2 x 0.2 cm red-tan soft tissue fragment.  The possible resection  margin is inked black, and the specimen is entirely submitted in  cassette D1.    E: The specimen is received in formalin with proper patient  identification, labeled \"rectal polyps x4\".  The specimen consists of  four red-tan soft tissue fragments ranging in size from 0.1-0.3 cm in  greatest dimension, which are entirely submitted in " cassette E1.  (Dictated by: Marcie Fisher 5/18/2017 05:28 PM)    MICROSCOPIC:  A formal microscopic examination was performed.    CPT Codes:  A: 44974-OY6  B: 39473-RF3  C: 45596-DH8  D: 18112-LR2  E: 03647-JP4    TESTING LAB LOCATION:  [facility] [address] [place]   [id number] [phone]    COLLECTION SITE:  Client: [facility]  Location: [facility] (B)           It was a pleasure to see you at your recent visit. Please let me know if you have any questions or concerns.     Clinic Staff - [phone] option 3     Sincerely,     Amy Gilbert MD  [address] [place] [address], Mail Code [id number]  [place]  [address].

## 2017-05-19 LAB — COPATH REPORT: NORMAL

## 2017-05-30 ENCOUNTER — OFFICE VISIT (OUTPATIENT)
Dept: SLEEP MEDICINE | Facility: CLINIC | Age: 52
End: 2017-05-30
Attending: INTERNAL MEDICINE
Payer: COMMERCIAL

## 2017-05-30 VITALS
HEIGHT: 71 IN | WEIGHT: 303.1 LBS | DIASTOLIC BLOOD PRESSURE: 89 MMHG | SYSTOLIC BLOOD PRESSURE: 138 MMHG | BODY MASS INDEX: 42.43 KG/M2 | OXYGEN SATURATION: 96 % | RESPIRATION RATE: 16 BRPM | HEART RATE: 76 BPM

## 2017-05-30 DIAGNOSIS — R09.02 HYPOXEMIA: ICD-10-CM

## 2017-05-30 DIAGNOSIS — G47.33 OSA TREATED WITH BIPAP: Primary | ICD-10-CM

## 2017-05-30 NOTE — PROGRESS NOTES
SLEEP MEDICINE FOLLOWUP VISIT       DATE OF VISIT:  05/30/2017       CHIEF COMPLAINT AND PURPOSE OF VISIT:  Review polysomnogram test results and followup of BiPAP treatment.      HISTORY OF PRESENT ILLNESS:  Mr. Phan Eduardo is a 51-year-old male who presents to the Sleep Clinic today to review the results of the recently obtained split-night PSG evaluation.  The sleep study was obtained on 05/10/2017    During the diagnostic study the sleep architecture was remarkable for severe sleep fragmentation with an arousal index of 98.2 per hour and reduced sleep efficiency.  Only stages N1 and N2 were observed.  Both stages N3 and REM sleep were not observed.  There were 54 obstructive apneas and 160 obstructive hypopneas resulting in a combined apnea-hypopnea index of 97.3 per hour with a respiratory disturbance index of 97.3 per hour.  Transcutaneous carbon dioxide monitoring was used; however, significant hypoventilation was not present with a maximum change of 41-48 mmHg.  Arterial blood gas analysis prior to the sleep study did not show evidence of CO2 retention, though there was evidence of reduced awake O2 levels.  The baseline oxygen saturation was low at 87.9% and the lowest O2 saturation was 68%.  Sleep-associated hypoxemia was present with a cumulative sleep time of 105.2 minutes with oxygen saturations less than or equal to 89%.  During the second part of the night CPAP titration was initiated, but since the device could not be tolerated by the patient, the device was switched to bilevel device.  At the final bilevel pressure settings of IPAP equal to 13 and EPAP equal to 8, there was significant improvement in the sleep-related breathing disorders including improvement in the oxygen levels during REM sleep in lateral position.  REM sleep in supine position was not observed at the final pressure setting.  During the baseline part of the study there were no PLMs.  There were frequent PLMs during the positive  "airway pressure therapy part of the study, but they were not associated with significant arousals.  Sleep talking was noted during the baseline and there were no arrhythmias during the sleep study.  The results of the study were discussed with him in detail.      Following the polysomnography evaluation, the patient obtained the BiPAP device through [facility] [facility] on 05/18/2017.  He reports using the device regularly since he has obtained it.  His roommate at his current facility has been reporting that the noise of the machine is somewhat disturbing him but there have not been any reports of snoring while he is using the device.  He reports that he could use a little more pressure when he is sleeping on his sides, and he also reported that he likes to sleep on his back and he does not find it comfortable to sleep on his sides only all the time and was wondering if I could adjust the pressure settings on his device so that he can be comfortable while sleeping on the back because he has noticed that any time when he sleeps on his back he wakes up because obviously it is not giving him enough pressure during supine sleep.  Other than that, he reports improvement in his sleep quality with reduction in the frequency of nocturnal awakenings and he has been waking up comparatively feeling a little more refreshed and he denies excessive daytime sleepiness.  There has been a significant improvement in the [place] Sleepiness Score from  23/24 to 5/24 during today's visit.       Current meds, Past medical history, Past surgical history, Allergies, Social history, Family history: reviewed, per EMR    Exam:  /89  Pulse 76  Resp 16  Ht 1.791 m (5' 10.5\")  Wt (!) 137.5 kg (303 lb 1.6 oz)  SpO2 96%  BMI 42.88 kg/m2  General appearance:  in no apparent distress  Pt is dressed casually, cooperative with good eye contact.   Speech is spontaneous with regular rate and volume.   Mood: euthymic; affect congruent with " full range and intensity.   Sensorium: awake, alert and oriented to person, place, time, and situation.       ASSESSMENT/ PLAN:  Severe obstructive sleep apnea with sleep-associated hypoxemia, including low awake oxygen saturations.  The patient reports subjective improvement in his overall symptoms since he has been using the bilevel device with resolution of excessive daytime sleepiness.  I do not have a downloadable compliance data and I have requested the [facility] to fax me the compliance measures to review.  During the polysomnography evaluation, BiPAP equal to 13 cm of water, REM sleep in lateral position was observed, but not REM supine and since he is interested in trying to sleep on his back also and requests increase in the pressure, I have recommended increasing the IPAP to 15 keeping the EPAP at 8 and he is planning on taking his device to the [facility] today to get the pressure settings adjusted.  Then the plan is to obtain an overnight oximetry while he is using the BiPAP device at the revised pressure setting along with parallel compliance download.  The results of the overnight oximetry will be communicated to him via letter that will be sent to [address] [place] [address].   Recommend  obtaining complete pulmonary function tests for further evaluation of awake and sleep-associated hypoxemia.     He will also need a face-to-face followup at the Sleep Clinic between 61-90 days after he has obtained the bilevel device for insurance reasons.      He was encouraged to continue not to smoke.  He was instructed not to drive or operate heavy machinery if he is drowsy or sleepy to prevent accidents.  We discussed weight management with diet and exercise.          TOTAL TIME SPENT DURING THIS VISIT:  25 minutes, face to face , more than 50% spent in counseling and coordinating plan of care regarding sleep apnea, BIPAP treatment, hypoxemia work up.        MILADY CAMPBELL  MD BHARAT             D: 2017 11:44   T: 2017 13:22   MT: carlos      Name:     JANET CLAIRE   MRN:      [id number]        Account:      [id number]   :      1965           Visit Date:   2017      Document: [id number]

## 2017-05-30 NOTE — MR AVS SNAPSHOT
After Visit Summary   5/30/2017    Phan Eduardo    MRN: [id number]           Patient Information     Date Of Birth          1965        Visit Information        Provider Department      5/30/2017 9:30 AM Narcisa Moe MD [facility]        Today's Diagnoses     ROBERTH treated with BiPAP    -  1    Hypoxemia           Follow-ups after your visit        Follow-up notes from your care team     Return in about 8 weeks (around 7/25/2017).      Your next 10 appointments already scheduled     Jun 01, 2017  1:40 PM CDT   Return Visit with Misty Watkins MD   [facility] ([facility])    [address],  [address]  [place] [id number] [phone]            Jun 05, 2017  1:20 PM CDT   Return Visit with PINKY Acevedo CNP   [facility] ([facility])    [address] [place],  [address]  [facility] [id number] [phone]              Future tests that were ordered for you today     Open Future Orders        Priority Expected Expires Ordered    Overnight oximetry study Routine  11/26/2017 5/30/2017    General PFT Lab (Please always keep checked) Routine  5/30/2018 5/30/2017    Pulmonary Function Test Routine  5/30/2018 5/30/2017            Who to contact     If you have questions or need follow up information about today's clinic visit or your schedule please contact [facility] directly at [phone].  Normal or non-critical lab and imaging results will be communicated to you by MyChart, letter or phone within 4 business days after the clinic has received the results. If you do not hear from us within 7 days, please contact the clinic through [employer] or phone. If you have a critical or abnormal lab result, we will notify you by phone as soon as possible.  Submit refill requests through [employer] or call your pharmacy and they will forward the refill request to us. Please  "allow 3 business days for your refill to be completed.          Additional Information About Your Visit        MyChart Information     [email or web address] lets you send messages to your doctor, view your test results, renew your prescriptions, schedule appointments and more. To sign up, go to [email or web address] . Click on \"Log in\" on the left side of the screen, which will take you to the Welcome page. Then click on \"Sign up Now\" on the right side of the page.     You will be asked to enter the access code listed below, as well as some personal information. Please follow the directions to create your username and password.     Your access code is: [email or web address]  Expires: 2017  2:12 PM     Your access code will  in 90 days. If you need help or a new code, please call your [facility] clinic or [phone].        Care EveryWhere ID     This is your Care EveryWhere ID. This could be used by other organizations to access your [facility] medical records  [id number]        Your Vitals Were     Pulse Respirations Height Pulse Oximetry BMI (Body Mass Index)       76 16 1.791 m (5' 10.5\") 96% 42.88 kg/m2        Blood Pressure from Last 3 Encounters:   17 138/89   17 115/68   17 (!) 128/94    Weight from Last 3 Encounters:   17 (!) 137.5 kg (303 lb 1.6 oz)   17 135.3 kg (298 lb 3.2 oz)   17 (!) 137.4 kg (303 lb)              We Performed the Following     Comprehensive DME          Today's Medication Changes          These changes are accurate as of: 17  4:23 PM.  If you have any questions, ask your nurse or doctor.               Stop taking these medicines if you haven't already. Please contact your care team if you have questions.     nicotine polacrilex 4 MG lozenge   Commonly known as:  COMMIT   Stopped by:  Narcisa Moe MD                    Primary Care Provider Office Phone # Fax #    PINKY Acevedo [facility] [phone]       " [facility] [address] [facility] [address]        Thank you!     Thank you for choosing [facility]EP [facility]  for your care. Our goal is always to provide you with excellent care. Hearing back from our patients is one way we can continue to improve our services. Please take a few minutes to complete the written survey that you may receive in the mail after your visit with us. Thank you!             Your Updated Medication List - Protect others around you: Learn how to safely use, store and throw away your medicines at www.disposemymeds.org.          This list is accurate as of: 5/30/17  4:23 PM.  Always use your most recent med list.                   Brand Name Dispense Instructions for use    amphetamine-dextroamphetamine 30 MG per 24 hr capsule    ADDERALL XR    30 capsule    Take 1 capsule (30 mg) by mouth daily       atenolol 25 MG tablet    TENORMIN    30 tablet    Take 1 tablet (25 mg) by mouth daily       cetirizine 10 MG tablet    zyrTEC    30 tablet    Take 1 tablet (10 mg) by mouth every evening       hydrochlorothiazide 25 MG tablet    HYDRODIURIL    30 tablet    Take 1 tablet (25 mg) by mouth daily       levothyroxine 175 MCG tablet    SYNTHROID/LEVOTHROID    30 tablet    Take 1 tablet (175 mcg) by mouth daily       omeprazole 20 MG CR capsule    priLOSEC    30 capsule    Take 1 capsule (20 mg) by mouth daily

## 2017-05-30 NOTE — PROGRESS NOTES
DICTATED THE SLEEP CLINIC VISIT NOTE FOR TODAY.  Narcisa Moe MD   of Medicine,  Division of Pulmonary/Sleep Medicine  [facility].

## 2017-05-30 NOTE — NURSING NOTE
Called [facility] to obtain his/patient most recent download, respiratory therapist stated that Mr. Eduardo is noncompliant and very difficult to get a hold of.....  Also stated due to noncompliance there is no down load to send/fax...    Collins VIRAMONTES

## 2017-06-01 ENCOUNTER — OFFICE VISIT (OUTPATIENT)
Dept: FAMILY MEDICINE | Facility: CLINIC | Age: 52
End: 2017-06-01

## 2017-06-01 VITALS
WEIGHT: 306 LBS | TEMPERATURE: 97.7 F | SYSTOLIC BLOOD PRESSURE: 119 MMHG | BODY MASS INDEX: 43.29 KG/M2 | DIASTOLIC BLOOD PRESSURE: 79 MMHG | OXYGEN SATURATION: 94 % | HEART RATE: 85 BPM | RESPIRATION RATE: 18 BRPM

## 2017-06-01 DIAGNOSIS — M79.89 LEG SWELLING: ICD-10-CM

## 2017-06-01 DIAGNOSIS — M25.561 ACUTE PAIN OF RIGHT KNEE: ICD-10-CM

## 2017-06-01 DIAGNOSIS — L03.116 CELLULITIS OF LEFT LOWER EXTREMITY: Primary | ICD-10-CM

## 2017-06-01 DIAGNOSIS — E03.9 HYPOTHYROIDISM, UNSPECIFIED TYPE: ICD-10-CM

## 2017-06-01 PROBLEM — R60.0 BILATERAL EDEMA OF LOWER EXTREMITY: Status: ACTIVE | Noted: 2017-06-01

## 2017-06-01 LAB
BUN SERPL-MCNC: 20.7 MG/DL (ref 7–21)
CALCIUM SERPL-MCNC: 9.7 MG/DL (ref 8.5–10.1)
CHLORIDE SERPLBLD-SCNC: 102 MMOL/L (ref 98–110)
CO2 SERPL-SCNC: 34.6 MMOL/L (ref 20–32)
CREAT SERPL-MCNC: 1.1 MG/DL (ref 0.7–1.3)
GFR SERPL CREATININE-BSD FRML MDRD: 74.7 ML/MIN/1.7 M2
GLUCOSE SERPL-MCNC: 141.9 MG'DL (ref 70–99)
POTASSIUM SERPL-SCNC: 3.2 MMOL/DL (ref 3.3–4.5)
SODIUM SERPL-SCNC: 136.6 MMOL/L (ref 132.6–141.4)
T4 FREE SERPL-MCNC: 0.93 NG/DL (ref 0.76–1.46)
TSH SERPL DL<=0.005 MIU/L-ACNC: 4.9 MU/L (ref 0.4–4)

## 2017-06-01 RX ORDER — DEXTROAMPHETAMINE SACCHARATE, AMPHETAMINE ASPARTATE MONOHYDRATE, DEXTROAMPHETAMINE SULFATE AND AMPHETAMINE SULFATE 7.5; 7.5; 7.5; 7.5 MG/1; MG/1; MG/1; MG/1
30 CAPSULE, EXTENDED RELEASE ORAL DAILY
Qty: 30 CAPSULE | Refills: 0 | Status: CANCELLED | OUTPATIENT
Start: 2017-06-01

## 2017-06-01 RX ORDER — DIPHENHYDRAMINE HCL/ZINC ACET 2 %-0.1 %
1 AEROSOL, SPRAY (GRAM) TOPICAL 2 TIMES DAILY
Qty: 21 CAPSULE | Refills: 0 | Status: SHIPPED | OUTPATIENT
Start: 2017-06-01

## 2017-06-01 RX ORDER — FUROSEMIDE 20 MG
20 TABLET ORAL DAILY
Qty: 30 TABLET | Refills: 1 | Status: SHIPPED | OUTPATIENT
Start: 2017-06-01 | End: 2017-06-05

## 2017-06-01 ASSESSMENT — ENCOUNTER SYMPTOMS
FATIGUE: 0
JOINT SWELLING: 1
COUGH: 0
ARTHRALGIAS: 1
SHORTNESS OF BREATH: 0
NUMBNESS: 0
COLOR CHANGE: 1
DIAPHORESIS: 0
CHILLS: 0
FEVER: 0

## 2017-06-01 NOTE — PROGRESS NOTES
Preceptor Attestation:   Patient seen and discussed with the resident. Assessment and plan reviewed with resident and agreed upon.   Supervising Physician:  Alvarez Wheeler MD  [facility] Medicine

## 2017-06-01 NOTE — PATIENT INSTRUCTIONS
Here is the plan from today's visit    1. Cellulitis of left lower extremity  Continue your Doxycycline 100mg BID to complete the 10 day course. Follow up in clinic or go to an ED if worsening redness, fevers or chills. We will also start lasix 20mg daily today to help reduce swelling. I will also prescribe a probiotic to prevent diarrheal infection from antibiotics.     2. Acute pain of right knee  Continue conservative management with ice, knee brace, tylenol for pain control. Do not exceed more than 4g of tylenol in one day    3. Leg swelling  - furosemide (LASIX) 20 MG tablet; Take 1 tablet (20 mg) by mouth daily  Dispense: 30 tablet; Refill: 1  - Basic Metabolic Panel ([facility])    4. Hypothyroidism, unspecified type  We will recheck your thyroid hormone today.   - TSH with free T4 reflex      Please call or return to clinic if your symptoms don't go away.    Follow up plan in 1 week    Thank you for coming to [facility] today.  Lab Testing:  **If you had lab testing today and your results are reassuring or normal they will be mailed to you or sent through [employer] within 7 days.   **If the lab tests need quick action we will call you with the results.  The phone number we will call with results is # [phone] (home) . If this is not the best number please call our clinic and change the number.  Medication Refills:  If you need any refills please call your pharmacy and they will contact us.   If you need to  your refill at a new pharmacy, please contact the new pharmacy directly. The new pharmacy will help you get your medications transferred faster.   Scheduling:  If you have any concerns about today's visit or wish to schedule another appointment please call our office during normal business hours [phone] (8-5:00 M-F)  If a referral was made to a [facility] and you don't get a call from central scheduling please call [phone].  If a Mammogram was ordered for you  at [facility] call [phone] to schedule or change your appointment.  If you had an XRay/CT/Ultrasound/MRI ordered the number is [phone] to schedule or change your radiology appointment.   Medical Concerns:  If you have urgent medical concerns please call [phone] at any time of the day.'

## 2017-06-01 NOTE — MR AVS SNAPSHOT
After Visit Summary   6/1/2017    Phan Eduardo    MRN: [id number]           Patient Information     Date Of Birth          1965        Visit Information        Provider Department      6/1/2017 1:40 PM Misty Watkins MD [facility]        Today's Diagnoses     Cellulitis of left lower extremity    -  1    Acute pain of right knee        Leg swelling        Hypothyroidism, unspecified type          Care Instructions    Here is the plan from today's visit    1. Cellulitis of left lower extremity  Continue your Doxycycline 100mg BID to complete the 10 day course. Follow up in clinic or go to an ED if worsening redness, fevers or chills. We will also start lasix 20mg daily today to help reduce swelling. I will also prescribe a probiotic to prevent diarrheal infection from antibiotics.     2. Acute pain of right knee  Continue conservative management with ice, knee brace, tylenol for pain control. Do not exceed more than 4g of tylenol in one day    3. Leg swelling  - furosemide (LASIX) 20 MG tablet; Take 1 tablet (20 mg) by mouth daily  Dispense: 30 tablet; Refill: 1  - Basic Metabolic Panel ([facility])    4. Hypothyroidism, unspecified type  We will recheck your thyroid hormone today.   - TSH with free T4 reflex      Please call or return to clinic if your symptoms don't go away.    Follow up plan in 1 week    Thank you for coming to [facility] today.  Lab Testing:  **If you had lab testing today and your results are reassuring or normal they will be mailed to you or sent through [employer] within 7 days.   **If the lab tests need quick action we will call you with the results.  The phone number we will call with results is # [phone] (home) . If this is not the best number please call our clinic and change the number.  Medication Refills:  If you need any refills please call your pharmacy and they will contact us.   If you need to  your refill at a new pharmacy,  please contact the new pharmacy directly. The new pharmacy will help you get your medications transferred faster.   Scheduling:  If you have any concerns about today's visit or wish to schedule another appointment please call our office during normal business hours [phone] (8-5:00 M-F)  If a referral was made to a [facility] and you don't get a call from central scheduling please call [phone].  If a Mammogram was ordered for you at [facility] call [phone] to schedule or change your appointment.  If you had an XRay/CT/Ultrasound/MRI ordered the number is [phone] to schedule or change your radiology appointment.   Medical Concerns:  If you have urgent medical concerns please call [phone] at any time of the day.'          Follow-ups after your visit        Your next 10 appointments already scheduled     Jun 05, 2017  1:20 PM CDT   Return Visit with PINKY Acevedo CNP's [facility] ([facility])    [facility] [address] [place] [address],  [address]  [place] [address]   [phone]              Who to contact     Please call your clinic at [phone] to:    Ask questions about your health    Make or cancel appointments    Discuss your medicines    Learn about your test results    Speak to your doctor   If you have compliments or concerns about an experience at your clinic, or if you wish to file a complaint, please contact [facility] Patient Relations at [phone] or email us at [email or web address]         Additional Information About Your Visit        MyChart Information     [employer] is an electronic gateway that provides easy, online access to your medical records. With [employer], you can request a clinic appointment, read your test results, renew a prescription or communicate with your care team.     To sign up for [email or web address] visit the website at [email or web address]   You will be asked to  enter the access code listed below, as well as some personal information. Please follow the directions to create your username and password.     Your access code is: [email or web address]  Expires: 2017  2:12 PM     Your access code will  in 90 days. If you need help or a new code, please contact your [facility] or call [phone] for assistance.        Care EveryWhere ID     This is your Care EveryWhere ID. This could be used by other organizations to access your [facility] medical records  [id number]        Your Vitals Were     Pulse Temperature Respirations Pulse Oximetry BMI (Body Mass Index)       85 97.7  F (36.5  C) (Oral) 18 94% 43.29 kg/m2        Blood Pressure from Last 3 Encounters:   17 119/79   17 138/89   17 115/68    Weight from Last 3 Encounters:   17 (!) 306 lb (138.8 kg)   17 (!) 303 lb 1.6 oz (137.5 kg)   17 298 lb 3.2 oz (135.3 kg)              We Performed the Following     Basic Metabolic Panel ([facility])     TSH with free T4 reflex          Today's Medication Changes          These changes are accurate as of: 17  2:54 PM.  If you have any questions, ask your nurse or doctor.               Start taking these medicines.        Dose/Directions    [employer] PROBIOTIC (LACTOBACILLUS) Caps   Used for:  Cellulitis of left lower extremity   Started by:  Misty Watkins MD        Dose:  1 capsule   Take 1 capsule by mouth 2 times daily   Quantity:  21 capsule   Refills:  0       furosemide 20 MG tablet   Commonly known as:  LASIX   Used for:  Leg swelling   Started by:  Misty Watkins MD        Dose:  20 mg   Take 1 tablet (20 mg) by mouth daily   Quantity:  30 tablet   Refills:  1       order for DME   Used for:  Acute pain of right knee   Started by:  Misty Watkins MD        Knee brace   Quantity:  1 Box   Refills:  0            Where to get your medicines      These medications were sent to [facility] -  [place], [place] - [address] [facility] [address]     Phone:  [phone]     [employer]S PROBIOTIC (LACTOBACILLUS) Caps    furosemide 20 MG tablet         Some of these will need a paper prescription and others can be bought over the counter.  Ask your nurse if you have questions.     Bring a paper prescription for each of these medications     order for DME                Primary Care Provider Office Phone # Fax #    Kassy Hidalgo, PINKY TASHA [phone]       [facility] [address] [facility] [address]        Thank you!     Thank you for choosing [facility]  for your care. Our goal is always to provide you with excellent care. Hearing back from our patients is one way we can continue to improve our services. Please take a few minutes to complete the written survey that you may receive in the mail after your visit with us. Thank you!             Your Updated Medication List - Protect others around you: Learn how to safely use, store and throw away your medicines at www.disposemymeds.org.          This list is accurate as of: 6/1/17  2:54 PM.  Always use your most recent med list.                   Brand Name Dispense Instructions for use    amphetamine-dextroamphetamine 30 MG per 24 hr capsule    ADDERALL XR    30 capsule    Take 1 capsule (30 mg) by mouth daily       atenolol 25 MG tablet    TENORMIN    30 tablet    Take 1 tablet (25 mg) by mouth daily       cetirizine 10 MG tablet    zyrTEC    30 tablet    Take 1 tablet (10 mg) by mouth every evening       CVS PROBIOTIC (LACTOBACILLUS) Caps     21 capsule    Take 1 capsule by mouth 2 times daily       furosemide 20 MG tablet    LASIX    30 tablet    Take 1 tablet (20 mg) by mouth daily       hydrochlorothiazide 25 MG tablet    HYDRODIURIL    30 tablet    Take 1 tablet (25 mg) by mouth daily       levothyroxine 175 MCG tablet    SYNTHROID/LEVOTHROID    30 tablet    Take 1 tablet (175 mcg) by mouth daily        omeprazole 20 MG CR capsule    priLOSEC    30 capsule    Take 1 capsule (20 mg) by mouth daily       order for DME     1 Box    Knee brace

## 2017-06-01 NOTE — PROGRESS NOTES
"      HPI:       Phan Eduardo is a 51 year old who presents with multiple medical problems:    1. F/U colonoscopy results  Colonoscopy was done 5/18/2017, with 11 3x10mm, non bleeding polyps in the rectum, sigmoid colon, transverse and ascending colon resected. See below for pathology. Recommendation per GI to repeat colonoscopy in 1 year for surveillance of multiple polyps    Pathology  Cecum, polypectomy-tubular adenoma, no evidence of high-grade dysplasia or invasive malignancy  Ascending colon, polypectomy x3: 8 fragments of sessile serrated adenoma, no evidence of cytological dysplasia or invasive malignancy  Transverse colon, polypectomy: tubular adenoma, no high grade dysplasia or invasive malignancy  Sigmoid colon, polypectomy: tubular adenoma, no high grade dysplasia or invasive malignancy  Rectum, polypectomy x4, hyperplastic polyp x4, no evidence of neoplastic polyp or malignancy     2. LLE cellulitis   Phan is currently being treated for LLE cellulitis. He was seen at [facility] ED on the 5/23 and 5/30 for this concern. He reports symptoms of redness and pain 2 weeks prior to being seen at the [facility] ED on 5/23. At that initial ED visit, a LE US was done to rule out DVT and Phan was started on Cephelexin 500mg 4x/day x 7 days (5/23-5/30). He was then seen at [facility] ED on 5/30 due to concern of persistent redness and infection. At that ED visit he was prescribed Doxycycline 100mg BID x10 days (5/30-6/9). No fever, no chills.   Phan reports that he has been experiencing baseline leg swelling as he has been gaining weight. He denies PND, but does sleep with multiple pillows but has been doing this for \"as long as I can remember.\" No SOB or chest pain with activity or at rest. He also has ROBERTH (diagnosed with sleep study) for which he uses BIPAP at home for.     3. Right knee pain,  Phan was playing basketball about 2 weeks ago when he injured his right knee.   Mechanism of injury: planted right foot and pivot motion, " no popping sound, no immediate swelling, no bruising.  Right knee pain worsening since onset, exacerbated with prolonged standing and walking. He also experiences stiffness if kept immobile for long periods of time, which improves after 10-15mins of movement.   No significant swelling currently. Phan mentions that, due to an illegal incident involving cocaine use, he was arrested by police with a consequence of 15 days community service. He is requesting a letter stating any restrictions based on current knee injury.     Problem, Medication and Allergy Lists were reviewed and are current.  Patient is an established patient of this clinic.         Review of Systems:   Review of Systems   Constitutional: Negative for chills, diaphoresis, fatigue and fever.   Respiratory: Negative for cough and shortness of breath.    Cardiovascular: Positive for leg swelling. Negative for chest pain.   Musculoskeletal: Positive for arthralgias and joint swelling.   Skin: Positive for color change (redness to LLE).        Positive for cellulitis of LLE   Neurological: Negative for numbness.             Physical Exam:     Patient Vitals for the past 24 hrs:   BP Temp Temp src Pulse Resp SpO2 Weight   06/01/17 1355 119/79 97.7  F (36.5  C) Oral 85 18 94 % (!) 306 lb (138.8 kg)     Body mass index is 43.29 kg/(m^2).  Vitals were reviewed and were normal. Morbidly obese     Physical Exam   Constitutional: No distress.   Cardiovascular: Normal rate, regular rhythm and normal heart sounds.  Exam reveals no gallop and no friction rub.    No murmur heard.  Bilateral LE pitting edema, L>R   Pulmonary/Chest: Breath sounds normal. No respiratory distress. He has no wheezes. He has no rales.   Musculoskeletal:        Right knee: Medial joint line tenderness noted.   Skin: He is not diaphoretic.        Psychiatric: He has a normal mood and affect. His behavior is normal. Judgment and thought content normal.   Right Knee Exam   Swelling:  Mild    Tenderness   The patient is experiencing tenderness in the medial joint line.    Range of Motion   Extension: Normal  Flexion:     Normal    Tests   McMurrays:  Medial - Positive      Lateral - Negative  Varus:  Negative  Valgus: Positive            Results:      Results from the last 24 hours  Results for orders placed or performed in visit on 06/01/17 (from the past 24 hour(s))   Basic Metabolic Panel ([facility])   Result Value Ref Range    Urea Nitrogen 20.7 7.0 - 21.0 mg/dL    Calcium 9.7 8.5 - 10.1 mg/dL    Chloride 102.0 98.0 - 110.0 mmol/L    Carbon Dioxide 34.6 (H) 20.0 - 32.0 mmol/L    Creatinine 1.1 0.7 - 1.3 mg/dL    Glucose 141.9 (H) 70.0 - 99.0 mg'dL    Potassium 3.2 (L) 3.3 - 4.5 mmol/dL    Sodium 136.6 132.6 - 141.4 mmol/L    GFR Estimate 74.7 >60.0 mL/min/1.7 m2    GFR Estimate If Black >90 >60.0 mL/min/1.7 m2     Assessment and Plan     1. Cellulitis of left lower extremity  Phan was advised to continue with the Doxycycline 100mg BID. A barrier to improvement in symptoms is the LE edema. Will start a low dose of furosemide to reduce swelling in addition to compression stockings which Phan already has at home. Advised to follow up in 1 week to evaluate for improvement in cellulitis and edema.  - Lactobacillus Rhamnosus, GG, (CVS PROBIOTIC, LACTOBACILLUS,) CAPS; Take 1 capsule by mouth 2 times daily  Dispense: 21 capsule; Refill: 0  -continue Doxycycline 100mg BID x10 days  -compression stockings and furosemide 20mg PO daily  -follow-up in 1 week     2. Acute pain of right knee  From exam, pain is located in the medial knee. May be due to medial meniscus tear, or MCL injury. In the absence hemarthrosis and gross instability, and good ROM in the knee, will manage conservatively with rest, ice for swelling, tylenol for pain control, and a knee brace for stability.  Consider Ortho referral if acutely worsening, unable to bear weight/ambulate.   - order for DME; Knee brace  Dispense: 1 Box; Refill:  0    3. Leg swelling  Will check baseline BMP today and start Furosemide 20mg daily. Advised to follow up in clinic in 1 week for repeat BMP and weight check. Will also evaluate for improvement in leg swelling.   - furosemide (LASIX) 20 MG tablet; Take 1 tablet (20 mg) by mouth daily  Dispense: 30 tablet; Refill: 1  - Basic Metabolic Panel ([facility])    4. Hypothyroidism, unspecified type  Last TSH checked 4/6/2017, on Synthroid 175mcg PO daily. Will recheck TSH today.   - TSH with free T4 reflex    There are no discontinued medications.  Options for treatment and follow-up care were reviewed with the patient. Phan Eduardo  engaged in the decision making process and verbalized understanding of the options discussed and agreed with the final plan.    Misty Watkins MD

## 2017-06-02 NOTE — PROGRESS NOTES
TSH improved from previously. Baseline BMP obtained prior to start of furosemide. K is slightly low. With start of furosemide, there is risk for worsening hypokalemia. Symptoms of hypokalemia were discussed with Phan with plan to return to clinic in 1 week for follow up and repeat BMP at that time.     Mariel Watkins MD

## 2017-06-05 ENCOUNTER — OFFICE VISIT (OUTPATIENT)
Dept: FAMILY MEDICINE | Facility: CLINIC | Age: 52
End: 2017-06-05

## 2017-06-05 VITALS
TEMPERATURE: 98.3 F | HEART RATE: 74 BPM | RESPIRATION RATE: 20 BRPM | WEIGHT: 306 LBS | HEIGHT: 71 IN | BODY MASS INDEX: 42.84 KG/M2 | OXYGEN SATURATION: 96 % | DIASTOLIC BLOOD PRESSURE: 70 MMHG | SYSTOLIC BLOOD PRESSURE: 106 MMHG

## 2017-06-05 DIAGNOSIS — M79.89 LEG SWELLING: ICD-10-CM

## 2017-06-05 DIAGNOSIS — I10 ESSENTIAL HYPERTENSION: Primary | ICD-10-CM

## 2017-06-05 DIAGNOSIS — F98.8 ADD (ATTENTION DEFICIT DISORDER): ICD-10-CM

## 2017-06-05 DIAGNOSIS — M25.561 ACUTE PAIN OF RIGHT KNEE: ICD-10-CM

## 2017-06-05 DIAGNOSIS — L03.116 CELLULITIS OF LEFT LOWER EXTREMITY: ICD-10-CM

## 2017-06-05 LAB
BUN SERPL-MCNC: 25.3 MG/DL (ref 7–21)
CALCIUM SERPL-MCNC: 9.1 MG/DL (ref 8.5–10.1)
CHLORIDE SERPLBLD-SCNC: 104.2 MMOL/L (ref 98–110)
CO2 SERPL-SCNC: 26.8 MMOL/L (ref 20–32)
CREAT SERPL-MCNC: 1.4 MG/DL (ref 0.7–1.3)
ERYTHROCYTE [SEDIMENTATION RATE] IN BLOOD: 10 MM/HR (ref 0–20)
GFR SERPL CREATININE-BSD FRML MDRD: 56.6 ML/MIN/1.7 M2
GLUCOSE SERPL-MCNC: 97.2 MG'DL (ref 70–99)
HCT VFR BLD AUTO: 41.4 % (ref 40–53)
HEMOGLOBIN: 13.8 G/DL (ref 13.3–17.7)
MCH RBC QN AUTO: 32.4 PG (ref 26.5–35)
MCHC RBC AUTO-ENTMCNC: 33.3 G/DL (ref 32–36)
MCV RBC AUTO: 97.2 FL (ref 78–100)
PLATELET # BLD AUTO: 252 K/UL (ref 150–450)
POTASSIUM SERPL-SCNC: 3.4 MMOL/DL (ref 3.3–4.5)
RBC # BLD AUTO: 4.26 M/UL (ref 4.4–5.9)
SODIUM SERPL-SCNC: 141.4 MMOL/L (ref 132.6–141.4)
WBC # BLD AUTO: 8.7 K/UL (ref 4–11)

## 2017-06-05 RX ORDER — FUROSEMIDE 20 MG
20 TABLET ORAL 2 TIMES DAILY
Qty: 30 TABLET | Refills: 1 | COMMUNITY
Start: 2017-06-05 | End: 2017-07-31

## 2017-06-05 RX ORDER — CLINDAMYCIN HCL 300 MG
300 CAPSULE ORAL EVERY 6 HOURS
Qty: 28 CAPSULE | Refills: 0 | Status: SHIPPED | OUTPATIENT
Start: 2017-06-05 | End: 2017-06-12

## 2017-06-05 RX ORDER — DEXTROAMPHETAMINE SACCHARATE, AMPHETAMINE ASPARTATE MONOHYDRATE, DEXTROAMPHETAMINE SULFATE AND AMPHETAMINE SULFATE 7.5; 7.5; 7.5; 7.5 MG/1; MG/1; MG/1; MG/1
30 CAPSULE, EXTENDED RELEASE ORAL DAILY
Qty: 30 CAPSULE | Refills: 0 | Status: SHIPPED | OUTPATIENT
Start: 2017-06-05 | End: 2017-07-14

## 2017-06-05 NOTE — MR AVS SNAPSHOT
After Visit Summary   6/5/2017    Phan Eduardo    MRN: [id number]           Patient Information     Date Of Birth          1965        Visit Information        Provider Department      6/5/2017 1:20 PM Kassy Hidalgo APRN CNP [facility]        Today's Diagnoses     Essential hypertension    -  1    Acute pain of right knee        Cellulitis of left lower extremity        Acute kidney injury (H)        ADD (attention deficit disorder)        Leg swelling          Care Instructions    Here is the plan from today's visit    1. Essential hypertension  - Basic Metabolic Panel (LabDAQ)    2. Acute pain of right knee  - Sports Medicine Clinic-[facility] INTERNAL REFERRAL  - X-ray rt knee 3 view; Future    3. Cellulitis of left lower extremity    - CBC with Plt ([facility])  - Erythrocyte Sedimentation Rate ([facility])  Stop Doxycycline.     - clindamycin (CLEOCIN) 300 MG capsule; Take 1 capsule (300 mg) by mouth every 6 hours for 7 days  Dispense: 28 capsule; Refill: 0    Follow-up in 1 week, sooner with no improvement or worsening in right leg redness and pain.     4. Acute kidney injury (H)  - Urinalysis, Micro If (UA) ([facility])    5. ADD (attention deficit disorder)  - amphetamine-dextroamphetamine (ADDERALL XR) 30 MG per 24 hr capsule; Take 1 capsule (30 mg) by mouth daily  Dispense: 30 capsule; Refill: 0    6. Leg swelling  - furosemide (LASIX) 20 MG tablet; Take 1 tablet (20 mg) by mouth 2 times daily  Dispense: 30 tablet; Refill: 1          Thank you for coming to [facility] today.  Lab Testing:  **If you had lab testing today and your results are reassuring or normal they will be mailed to you or sent through AppSocially within 7 days.   **If the lab tests need quick action we will call you with the results.  The phone number we will call with results is # [phone] (home) . If this is not the best number please call our clinic and change the number.  Medication  Refills:  If you need any refills please call your pharmacy and they will contact us.   If you need to  your refill at a new pharmacy, please contact the new pharmacy directly. The new pharmacy will help you get your medications transferred faster.   Scheduling:  If you have any concerns about today's visit or wish to schedule another appointment please call our office during normal business hours [phone] (8-5:00 M-F)  If a referral was made to a [facility] and you don't get a call from central scheduling please call [phone].  If a Mammogram was ordered for you at [facility] call [phone] to schedule or change your appointment.  If you had an XRay/CT/Ultrasound/MRI ordered the number is [phone] to schedule or change your radiology appointment.   Medical Concerns:  If you have urgent medical concerns please call [phone] at any time of the day.  If you have a medical emergency please call 461.            Follow-ups after your visit        Additional Services     Sports Medicine [facility] INTERNAL REFERRAL       Reason: right knee pain, 3 week history, injury with playing basketball  Urgency of Appointment: Next Available  Length of Problem: Acute (0-3 weeks since onset): Ordering Provider - please ensure that x-rays are obtained if concern for bone or joint.  What are you requesting be done? Diagnosis and Management Recommendations                  Future tests that were ordered for you today     Open Future Orders        Priority Expected Expires Ordered    X-ray rt knee 3 view Routine 6/5/2017 6/5/2018 6/5/2017            Who to contact     Please call your clinic at [phone] to:    Ask questions about your health    Make or cancel appointments    Discuss your medicines    Learn about your test results    Speak to your doctor   If you have compliments or concerns about an experience at your clinic, or if you wish to file a complaint, please  "contact [facility] Patient Relations at [phone] or email us at [email or web address]         Additional Information About Your Visit        nCrypted [employer] Medical Information     WegoWise is an electronic gateway that provides easy, online access to your medical records. With WegoWise, you can request a clinic appointment, read your test results, renew a prescription or communicate with your care team.     To sign up for WegoWise visit the website at [email or web address]   You will be asked to enter the access code listed below, as well as some personal information. Please follow the directions to create your username and password.     Your access code is: [email or web address]  Expires: 2017  2:12 PM     Your access code will  in 90 days. If you need help or a new code, please contact your [facility] or call [phone] for assistance.        Care EveryWhere ID     This is your Care EveryWhere ID. This could be used by other organizations to access your [place] medical records  [id number]        Your Vitals Were     Pulse Temperature Respirations Height Pulse Oximetry BMI (Body Mass Index)    74 98.3  F (36.8  C) (Oral) 20 5' 10.5\" (179.1 cm) 96% 43.29 kg/m2       Blood Pressure from Last 3 Encounters:   17 106/70   17 119/79   17 138/89    Weight from Last 3 Encounters:   17 (!) 306 lb (138.8 kg)   17 (!) 306 lb (138.8 kg)   17 (!) 303 lb 1.6 oz (137.5 kg)              We Performed the Following     Basic Metabolic Panel (LabDAQ)     CBC with Plt ([facility])     Erythrocyte Sedimentation Rate ([facility])     [facility] INTERNAL REFERRAL     Urinalysis, Micro If (UA) ([facility])          Today's Medication Changes          These changes are accurate as of: 17  2:24 PM.  If you have any questions, ask your nurse or doctor.               Start taking these medicines.        Dose/Directions    " clindamycin 300 MG capsule   Commonly known as:  CLEOCIN   Used for:  Cellulitis of left lower extremity   Started by:  Kassy Hidalgo APRN CNP        Dose:  300 mg   Take 1 capsule (300 mg) by mouth every 6 hours for 7 days   Quantity:  28 capsule   Refills:  0         These medicines have changed or have updated prescriptions.        Dose/Directions    LASIX 20 MG tablet   This may have changed:  when to take this   Used for:  Leg swelling   Generic drug:  furosemide   Changed by:  Kassy Hidalgo APRN CNP        Dose:  20 mg   Take 1 tablet (20 mg) by mouth 2 times daily   Quantity:  30 tablet   Refills:  1            Where to get your medicines      These medications were sent to [facility] [address] [place] [address]     Phone:  [phone]     clindamycin 300 MG capsule         Some of these will need a paper prescription and others can be bought over the counter.  Ask your nurse if you have questions.     Bring a paper prescription for each of these medications     amphetamine-dextroamphetamine 30 MG per 24 hr capsule                Primary Care Provider Office Phone # Fax #    PINKY Acevedo [phone]       [facility] [address]  [facility] [address]        Thank you!     Thank you for choosing [facility]  for your care. Our goal is always to provide you with excellent care. Hearing back from our patients is one way we can continue to improve our services. Please take a few minutes to complete the written survey that you may receive in the mail after your visit with us. Thank you!             Your Updated Medication List - Protect others around you: Learn how to safely use, store and throw away your medicines at www.disposemymeds.org.          This list is accurate as of: 6/5/17  2:24 PM.  Always use your most recent med list.                   Brand Name Dispense Instructions  for use    amphetamine-dextroamphetamine 30 MG per 24 hr capsule    ADDERALL XR    30 capsule    Take 1 capsule (30 mg) by mouth daily       atenolol 25 MG tablet    TENORMIN    30 tablet    Take 1 tablet (25 mg) by mouth daily       cetirizine 10 MG tablet    zyrTEC    30 tablet    Take 1 tablet (10 mg) by mouth every evening       clindamycin 300 MG capsule    CLEOCIN    28 capsule    Take 1 capsule (300 mg) by mouth every 6 hours for 7 days       CVS PROBIOTIC (LACTOBACILLUS) Caps     21 capsule    Take 1 capsule by mouth 2 times daily       hydrochlorothiazide 25 MG tablet    HYDRODIURIL    30 tablet    Take 1 tablet (25 mg) by mouth daily       LASIX 20 MG tablet   Generic drug:  furosemide     30 tablet    Take 1 tablet (20 mg) by mouth 2 times daily       levothyroxine 175 MCG tablet    SYNTHROID/LEVOTHROID    30 tablet    Take 1 tablet (175 mcg) by mouth daily       omeprazole 20 MG CR capsule    priLOSEC    30 capsule    Take 1 capsule (20 mg) by mouth daily       order for DME     1 Box    Knee brace

## 2017-06-05 NOTE — PROGRESS NOTES
HPI:       Phan Eduardo is a 51 year old who presents for the following  Patient presents with:  Infection: Leg infection, few weeks , worsening   Pain: right knee , few weeks, Sharp pain      1. Concern: Right Knee pain    Description of the problem Issues with right knee pain      When did it start?: 3 Weeks     Intensity: severe    Progression of Symptoms:  worsening    Therapies Tried Knee brace    What has worked?  Nothing    More painful with sitting down or lying down with watching TV.  Aggravated with getting up from seated position. Seems to improve with ambulation.   Knee brace has been helpful to wear with ambulation.      Has been taking furosemide, 20 mg once daily and states that he hasn't noticed a difference. No increase in urine output after taking furosemide.     Swelling hasn't been as bad.  Is only wearing compression stocking as needed, doesn't have them on today due to them being dirty.      2.   He was seen at [facility] ED on the 5/23 and 5/30 for Left LE cellulitis.  He reports symptoms of redness and pain 2 weeks prior to being seen at the [facility] ED on 5/23. At that initial ED visit, a LE US was done to rule out DVT and Phan was started on Cephelexin 500mg 4x/day x 7 days (5/23-5/30). He was then seen at [facility] ED on 5/30 due to concern of persistent redness and infection. At that ED visit he was prescribed Doxycycline 100mg BID x10 days (5/30-6/9). No fever, no chills.     Is still taking Doxycycline.  Worsening of redness and pain over the past few days.          Problem, Medication and Allergy Lists were reviewed and are current.  Patient is an established patient of this clinic.         Review of Systems:   Review of Systems   Constitutional: Negative.    Respiratory: Negative.    Cardiovascular: Positive for leg swelling. Negative for chest pain and palpitations.   Gastrointestinal: Negative.    Musculoskeletal: Positive for arthralgias (right knee pain).   Skin:        See HPI             " Physical Exam:   Patient Vitals for the past 24 hrs:   BP Temp Temp src Pulse Resp SpO2 Height Weight   06/05/17 1329 106/70 98.3  F (36.8  C) Oral 74 20 96 % 5' 10.5\" (179.1 cm) (!) 306 lb (138.8 kg)     Body mass index is 43.29 kg/(m^2).  Vitals were reviewed and were normal     Physical Exam   Constitutional: He is oriented to person, place, and time. He appears well-nourished. No distress.   Cardiovascular: Normal rate and regular rhythm.    Pulmonary/Chest: Effort normal and breath sounds normal. No respiratory distress. He has no wheezes.   Musculoskeletal: He exhibits edema (bilateral LE's with +1 edema).   Neurological: He is alert and oriented to person, place, and time.   Skin:        Psychiatric: He has a normal mood and affect.         Results:     Results for orders placed or performed in visit on 06/05/17   Basic Metabolic Panel (LabDAQ)   Result Value Ref Range    Urea Nitrogen 25.3 (H) 7.0 - 21.0 mg/dL    Calcium 9.1 8.5 - 10.1 mg/dL    Chloride 104.2 98.0 - 110.0 mmol/L    Carbon Dioxide 26.8 20.0 - 32.0 mmol/L    Creatinine 1.4 (H) 0.7 - 1.3 mg/dL    Glucose 97.2 70.0 - 99.0 mg'dL    Potassium 3.4 3.3 - 4.5 mmol/dL    Sodium 141.4 132.6 - 141.4 mmol/L    GFR Estimate 56.6 (L) >60.0 mL/min/1.7 m2    GFR Estimate If Black 68.4 >60.0 mL/min/1.7 m2   CBC with Plt ([facility])   Result Value Ref Range    WBC 8.7 4.0 - 11.0 K/uL    RBC 4.26 (L) 4.40 - 5.90 M/uL    Hemoglobin 13.8 13.3 - 17.7 g/dL    Hematocrit 41.4 40.0 - 53.0 %    MCV 97.2 78.0 - 100.0 fL    MCH 32.4 26.5 - 35.0 pg    MCHC 33.3 32.0 - 36.0 g/dL    Platelets 252.0 150.0 - 450.0 K/uL   Erythrocyte Sedimentation Rate ([facility])   Result Value Ref Range    Sed Rate 10 0 - 20 mm/hr   CRP inflammation   Result Value Ref Range    CRP Inflammation 4.7 0.0 - 8.0 mg/L       Assessment and Plan       Phan was seen today for infection and pain.    Diagnoses and all orders for this visit:    Essential hypertension  -     Basic Metabolic Panel " ([facility]DA[facility])    Acute pain of right knee  -     [facility] Medicine [facility] INTERNAL REFERRAL  -     X-ray rt knee 3 view; Future: no acute bony findings.     Cellulitis of left lower extremity  -     CBC [facility] Plt ([facility])  -     History of treatment with cephalexin and doxycyline, will plan to change antibiotic for possible MRSA coverage due to infection not resolving with previous antibiotics.  Recheck in 1 week, sooner as needed with any worsening of redness, swelling, pain.    -     clindamycin (CLEOCIN) 300 MG capsule; Take 1 capsule (300 mg) by mouth every 6 hours for 7 days  -     CRP inflammation    Creatinine elevation  Urea nitrogen of 25 and Creatinine of 1.4 - mild increase.   Discussed with PharmD, looked at medications for possible nephrotoxic medications.   Repeat BMP in one week.     Leg swelling  Increase furosemide to 20 mg 2 times daily.   Repeat BMP in one week.     Medication refill:   ADD (attention deficit disorder)  -     amphetamine-dextroamphetamine (ADDERALL XR) 30 MG per 24 hr capsule; Take 1 capsule (30 mg) by mouth daily          Options for treatment and follow-up care were reviewed with the patient. Phan Eduardo  engaged in the decision making process and verbalized understanding of the options discussed and agreed with the final plan.    Kassy Hidalgo, PINKY CNP

## 2017-06-05 NOTE — PATIENT INSTRUCTIONS
Here is the plan from today's visit    1. Essential hypertension  - Basic Metabolic Panel (LabDAQ)    2. Acute pain of right knee  - Sports Medicine Clinic-[facility] INTERNAL REFERRAL  - X-ray rt knee 3 view; Future    3. Cellulitis of left lower extremity    - CBC with Plt ([facility])  - Erythrocyte Sedimentation Rate ([facility])  Stop Doxycycline.     - clindamycin (CLEOCIN) 300 MG capsule; Take 1 capsule (300 mg) by mouth every 6 hours for 7 days  Dispense: 28 capsule; Refill: 0    Follow-up in 1 week, sooner with no improvement or worsening in right leg redness and pain.     4. Acute kidney injury (H)  - Urinalysis, Micro If (UA) ([facility])    5. ADD (attention deficit disorder)  - amphetamine-dextroamphetamine (ADDERALL XR) 30 MG per 24 hr capsule; Take 1 capsule (30 mg) by mouth daily  Dispense: 30 capsule; Refill: 0    6. Leg swelling  - furosemide (LASIX) 20 MG tablet; Take 1 tablet (20 mg) by mouth 2 times daily  Dispense: 30 tablet; Refill: 1          [facility].  Lab Testing:  **If you had lab testing today and your results are reassuring or normal they will be mailed to you or sent through SunPods within 7 days.   **If the lab tests need quick action we will call you with the results.  The phone number we will call with results is # [phone] (home) . If this is not the best number please call our clinic and change the number.  Medication Refills:  If you need any refills please call your pharmacy and they will contact us.   If you need to  your refill at a new pharmacy, please contact the new pharmacy directly. The new pharmacy will help you get your medications transferred faster.   Scheduling:  If you have any concerns about today's visit or wish to schedule another appointment please call our office during normal business hours [phone] (8-5:00 M-F)  If a referral was made to a [facility] and you don't get a call from [facility]  scheduling please call [phone].  If a Mammogram was ordered for you at [facility] call [phone] to schedule or change your appointment.  If you had an XRay/CT/Ultrasound/MRI ordered the number is [phone] to schedule or change your radiology appointment.   Medical Concerns:  If you have urgent medical concerns please call [phone] at any time of the day.  If you have a medical emergency please call 474.

## 2017-06-06 ENCOUNTER — OFFICE VISIT (OUTPATIENT)
Dept: FAMILY MEDICINE | Facility: CLINIC | Age: 52
End: 2017-06-06

## 2017-06-06 VITALS
TEMPERATURE: 97.4 F | BODY MASS INDEX: 43 KG/M2 | HEART RATE: 77 BPM | WEIGHT: 304 LBS | DIASTOLIC BLOOD PRESSURE: 97 MMHG | OXYGEN SATURATION: 97 % | SYSTOLIC BLOOD PRESSURE: 145 MMHG | RESPIRATION RATE: 18 BRPM

## 2017-06-06 DIAGNOSIS — E66.01 MORBID OBESITY DUE TO EXCESS CALORIES (H): ICD-10-CM

## 2017-06-06 DIAGNOSIS — M25.561 ACUTE PAIN OF RIGHT KNEE: Primary | ICD-10-CM

## 2017-06-06 LAB — CRP SERPL-MCNC: 4.7 MG/L (ref 0–8)

## 2017-06-06 ASSESSMENT — PAIN SCALES - GENERAL: PAINLEVEL: SEVERE PAIN (7)

## 2017-06-06 NOTE — MR AVS SNAPSHOT
After Visit Summary   6/6/2017    Phan Eduardo    MRN: [id number]           Patient Information     Date Of Birth          1965        Visit Information        Provider Department      6/6/2017 11:20 AM Latoya Gomez MD [facility]        Today's Diagnoses     Acute pain of right knee    -  1       Follow-ups after your visit        Additional Services     PHYSICAL THERAPY REFERRAL - EXTERNAL       Pool therapy- sister dahlia/[facility]    Patient requests assistance from the Care Coordinator to schedule this appointment            WEIGHT MANANGEMENT/[facility] LIFESTYLE PROGRAM REFERRAL - INTERNAL       To schedule an appointment, please call [facility] at [phone]    Patient requests assistance from the Care Coordinator to schedule this appointment    If you have difficulty getting this referral arranged, please call [facility] at [phone] and ask for the care coordinator.                  Your next 10 appointments already scheduled     Jun 12, 2017  1:20 PM CDT   Return Visit with PINKY Acevedo CNP   [facility])    [facility] [address]  [address]  [place] [address]   [phone]              Who to contact     Please call your clinic at [phone] to:    Ask questions about your health    Make or cancel appointments    Discuss your medicines    Learn about your test results    Speak to your doctor   If you have compliments or concerns about an experience at your clinic, or if you wish to file a complaint, please contact [facility] Patient Relations at [phone] or email us at [email or web address]         Additional Information About Your Visit        MyChart Information     Upstream is an electronic gateway that provides easy, online access to your medical records. With Upstream, you can request a clinic appointment, read your test  results, renew a prescription or communicate with your care team.     To sign up for MyChart visit the website at [email or web address]   You will be asked to enter the access code listed below, as well as some personal information. Please follow the directions to create your username and password.     Your access code is: [email or web address]  Expires: 2017  2:12 PM     Your access code will  in 90 days. If you need help or a new code, please contact your [facility] or call [phone] for assistance.        Care EveryWhere ID     This is your Care EveryWhere ID. This could be used by other organizations to access your [place] medical records  [id number]        Your Vitals Were     Pulse Temperature Respirations Pulse Oximetry BMI (Body Mass Index)       77 97.4  F (36.3  C) (Oral) 18 97% 43 kg/m2        Blood Pressure from Last 3 Encounters:   17 (!) 145/97   17 106/70   17 119/79    Weight from Last 3 Encounters:   17 (!) 304 lb (137.9 kg)   17 (!) 306 lb (138.8 kg)   17 (!) 306 lb (138.8 kg)              We Performed the Following     PHYSICAL THERAPY REFERRAL - EXTERNAL     WEIGHT MANANGEMENT/[facility] LIFESTYLE PROGRAM REFERRAL - INTERNAL        Primary Care Provider Office Phone # Fax #    Kassy BlanchardPINKY mayes [facility] [phone]       [facility] [address] [facility] [address]        Thank you!     Thank you for choosing \[facility]\"" FAMILY [facility]INE [facility]  for your care. Our goal is always to provide you with excellent care. Hearing back from our patients is one way we can continue to improve our services. Please take a few minutes to complete the written survey that you may receive in the mail after your visit with us. Thank you!             Your Updated Medication List - Protect others around you: Learn how to safely use, store and throw away your medicines at www.disposemymeds.org.          This list is accurate  as of: 6/6/17 12:15 PM.  Always use your most recent med list.                   Brand Name Dispense Instructions for use    amphetamine-dextroamphetamine 30 MG per 24 hr capsule    ADDERALL XR    30 capsule    Take 1 capsule (30 mg) by mouth daily       atenolol 25 MG tablet    TENORMIN    30 tablet    Take 1 tablet (25 mg) by mouth daily       cetirizine 10 MG tablet    zyrTEC    30 tablet    Take 1 tablet (10 mg) by mouth every evening       clindamycin 300 MG capsule    CLEOCIN    28 capsule    Take 1 capsule (300 mg) by mouth every 6 hours for 7 days       CVS PROBIOTIC (LACTOBACILLUS) Caps     21 capsule    Take 1 capsule by mouth 2 times daily       hydrochlorothiazide 25 MG tablet    HYDRODIURIL    30 tablet    Take 1 tablet (25 mg) by mouth daily       LASIX 20 MG tablet   Generic drug:  furosemide     30 tablet    Take 1 tablet (20 mg) by mouth 2 times daily       levothyroxine 175 MCG tablet    SYNTHROID/LEVOTHROID    30 tablet    Take 1 tablet (175 mcg) by mouth daily       omeprazole 20 MG CR capsule    priLOSEC    30 capsule    Take 1 capsule (20 mg) by mouth daily       order for DME     1 Box    Knee brace

## 2017-06-06 NOTE — PROGRESS NOTES
SUBJECTIVE: Phan Eduardo is a 51 year old male who reports he's been here a lot lately.  Out in the back yard of place he's staying, grabbed the bball and was shooting around.  Thinks there was a pivot or spinning motion that then brought on the pain.  Waiting a few weeks and pain is actually worse not better.  X-rays were good.  Bothersome when there is weight of his leg.  Weight of the joint bends downward.  Stiffness with sitting, many steps before he can get the knee loosened up.  Hasn't done any PT.  Disruption of sleep, has ROBERTH, 1 week of sleep and after injury now he's waking up.  Left lower leg with infection- on antibiotics.  Broken left leg- in shape of an L, told that when he's older he'd likely have an issue and that caused an infection.  Obese- swelling into legs a lot.  Given Lasix, dry mouth, doubling up on meds and now urinating more.  Drinks a lot of water.  No heart issues.  Has HTN.  278 lbs up to 304 lbs.  Never liked exercising.  Always worked hard, now battling with addiction, not currently working.  Gets clean and then eats more, eats less now and it sticks to him.  Seen by Nutrition in the past, knows what to do, hasn't put it into practice.  Mtn Dew is his weakness.  Problems with portion control.  In Rehab so has some time to focus on weight loss.  PAST MEDICAL, SOCIAL, SURGICAL AND FAMILY HISTORY: He  has a past medical history of Hypertension and Thyroid disease.  He  has a past surgical history that includes orthopedic surgery; ENT surgery; and Colonoscopy (N/A, 5/18/2017).  His family history includes Coronary Artery Disease in his father; DIABETES in his father; Other Cancer in his mother.  He reports that he quit smoking about 7 weeks ago. His smoking use included Cigarettes. He does not have any smokeless tobacco history on file. He reports that he does not drink alcohol or use illicit drugs.      ALLERGIES: He has No Known Allergies.    CURRENT MEDICATIONS: He has a current  medication list which includes the following prescription(s): amphetamine-dextroamphetamine, furosemide, clindamycin, cvs probiotic (lactobacillus), order for dme, cetirizine, hydrochlorothiazide, atenolol, omeprazole, and levothyroxine.     REVIEW OF SYSTEMS: 10 point review of systems is negative except as noted above.    EXAM:  BP (!) 145/97  Pulse 77  Temp 97.4  F (36.3  C) (Oral)  Resp 18  Wt (!) 304 lb (137.9 kg)  SpO2 97%  BMI 43 kg/m2  CONSTITUTIONIAL: alert, no distress, cooperative and obese  HEAD: Normocephalic. No masses, lesions, tenderness or abnormalities  ENT: ENT exam normal, no neck nodes or sinus tenderness  SKIN: no suspicious lesions or rashes  GAIT: normal, broad based  Stance: normal  NEUROLOGIC: Normal muscle tone and strength, reflexes normal, sensation grossly normal.  PSYCHIATRIC: affect normal/bright and mentation appears normal.    MUSCULOSKELETAL: right medial knee pain      Inspection:       AP/lateral alignment normal  Tender: MCL, posterior medial meniscus- medial joint line      Non-tender: patellar facets, LCL, lateral joint line, IT band, posterior knee   Active Range of Motion: all normal  Strength: quad  5/5, Hamstrings  5/5, Gastroc  5/5, Tibialis anterior  5/5, Peroneals  5/5 and core strength  4+/5 hip abductors and other core muscles   Special tests: normal Valgus stress test, normal Varus, negative Lachman's test, negative posterior drawer, no posterolateral corner signs, positive Fred's to pain on right medial aspect, no apprehension with lateral stress of the patella.        ASSESSMENT/PLAN:  Pt is a 50 yo obese white male currently in rehab setting with PMHx of ROBERTH on BiPAP presenting with acute right knee injury with pivoting mechanism  1. Right knee pain- concern for MCL sprain and MMT- strongly encouraged low impact activities and limited side to side, has brace that is working.  Agreeable to pool therapy and after will graduate to land therapy.  Discussed  what if symptoms progress.  Consider cortisone injection, currently has cellulitis and Cr is bumped.  Will f/u with Kassy next week for labs  Discussed pros and cons of ordering MRI    2. Morbidly obese- lifestyle clinic for increasing activity and nutrition.  Decrease portions, decrease Mtn Dew intake, write down 2 weeks of meals prior to visit with Nutritionist  [employer]  RTC 6 weeks after pool therapy started  X-RAY INTERPRETATION:   X-Ray of the Right Knee: 3-view, Ivory, lateral, sunrise   ordered and interpreted in the office today was negative for fracture, subluxation or joint space abnormality.  Impression:  1. No acute osseous abnormality.  2. No substantial degenerative change.

## 2017-06-06 NOTE — PATIENT INSTRUCTIONS
[facility] Pool therapy  Referral faxed to [facility], they are to review and will contact the patient directly to schedule

## 2017-06-07 ASSESSMENT — ENCOUNTER SYMPTOMS
PALPITATIONS: 0
ARTHRALGIAS: 1
RESPIRATORY NEGATIVE: 1
GASTROINTESTINAL NEGATIVE: 1
CONSTITUTIONAL NEGATIVE: 1
ROS SKIN COMMENTS: SEE HPI

## 2017-06-12 ENCOUNTER — OFFICE VISIT (OUTPATIENT)
Dept: FAMILY MEDICINE | Facility: CLINIC | Age: 52
End: 2017-06-12

## 2017-06-12 ENCOUNTER — RESULTS ONLY (OUTPATIENT)
Dept: FAMILY MEDICINE | Facility: CLINIC | Age: 52
End: 2017-06-12

## 2017-06-12 VITALS
DIASTOLIC BLOOD PRESSURE: 87 MMHG | SYSTOLIC BLOOD PRESSURE: 113 MMHG | WEIGHT: 301.2 LBS | TEMPERATURE: 97.6 F | OXYGEN SATURATION: 94 % | HEART RATE: 72 BPM | BODY MASS INDEX: 42.61 KG/M2 | RESPIRATION RATE: 16 BRPM

## 2017-06-12 DIAGNOSIS — L03.116 CELLULITIS OF LEFT LOWER EXTREMITY: ICD-10-CM

## 2017-06-12 DIAGNOSIS — I10 ESSENTIAL HYPERTENSION: ICD-10-CM

## 2017-06-12 DIAGNOSIS — R60.0 BILATERAL EDEMA OF LOWER EXTREMITY: Primary | ICD-10-CM

## 2017-06-12 DIAGNOSIS — M25.561 ACUTE PAIN OF RIGHT KNEE: ICD-10-CM

## 2017-06-12 LAB
BUN SERPL-MCNC: 18.8 MG/DL (ref 7–21)
CALCIUM SERPL-MCNC: 9.6 MG/DL (ref 8.5–10.1)
CHLORIDE SERPLBLD-SCNC: 100.4 MMOL/L (ref 98–110)
CO2 SERPL-SCNC: 24.4 MMOL/L (ref 20–32)
CREAT SERPL-MCNC: 1 MG/DL (ref 0.7–1.3)
GFR SERPL CREATININE-BSD FRML MDRD: 84.4 ML/MIN/1.7 M2
GLUCOSE SERPL-MCNC: 93 MG'DL (ref 70–99)
POTASSIUM SERPL-SCNC: 3.3 MMOL/DL (ref 3.3–4.5)
SODIUM SERPL-SCNC: 137.4 MMOL/L (ref 132.6–141.4)

## 2017-06-12 ASSESSMENT — ENCOUNTER SYMPTOMS
CARDIOVASCULAR NEGATIVE: 1
ARTHRALGIAS: 1
RESPIRATORY NEGATIVE: 1
GASTROINTESTINAL NEGATIVE: 1
CONSTITUTIONAL NEGATIVE: 1
ROS SKIN COMMENTS: SEE HPI

## 2017-06-12 NOTE — MR AVS SNAPSHOT
After Visit Summary   2017    Phan Eduardo    MRN: [id number]           Patient Information     Date Of Birth          1965        Visit Information        Provider Department      2017 1:20 PM Kassy Hidalgo APRN CNP [facility]        Today's Diagnoses     Bilateral edema of lower extremity    -  1    Cellulitis of left lower extremity        Essential hypertension        Acute pain of right knee           Follow-ups after your visit        Who to contact     Please call your clinic at [phone] to:    Ask questions about your health    Make or cancel appointments    Discuss your medicines    Learn about your test results    Speak to your doctor   If you have compliments or concerns about an experience at your clinic, or if you wish to file a complaint, please contact [facility] Patient Relations at [phone] or email us at [email or web address]         Additional Information About Your Visit        MyChart Information     Flybits is an electronic gateway that provides easy, online access to your medical records. With Flybits, you can request a clinic appointment, read your test results, renew a prescription or communicate with your care team.     To sign up for [employer] visit the website at [email or web address]   You will be asked to enter the access code listed below, as well as some personal information. Please follow the directions to create your username and password.     Your access code is: [email or web address]  Expires: 2017  2:12 PM     Your access code will  in 90 days. If you need help or a new code, please contact your [facility] Clinic or call [phone] for assistance.        Care EveryWhere ID     This is your Care EveryWhere ID. This could be used by other organizations to access your [facility] medical records  [id number]        Your Vitals Were     Pulse  Temperature Respirations Pulse Oximetry BMI (Body Mass Index)       72 97.6  F (36.4  C) (Oral) 16 94% 42.61 kg/m2        Blood Pressure from Last 3 Encounters:   06/12/17 113/87   06/06/17 (!) 145/97   06/05/17 106/70    Weight from Last 3 Encounters:   06/12/17 (!) 301 lb 3.2 oz (136.6 kg)   06/06/17 (!) 304 lb (137.9 kg)   06/05/17 (!) 306 lb (138.8 kg)              Today, you had the following     No orders found for display       Primary Care Provider Office Phone # Fax #    Kassy Hidalgo, APRN [facility] [phone]       [facility] [address] [facility] [address]        Thank you!     Thank you for choosing [facility]  for your care. Our goal is always to provide you with excellent care. Hearing back from our patients is one way we can continue to improve our services. Please take a few minutes to complete the written survey that you may receive in the mail after your visit with us. Thank you!             Your Updated Medication List - Protect others around you: Learn how to safely use, store and throw away your medicines at www.disposemymeds.org.          This list is accurate as of: 6/12/17  8:24 PM.  Always use your most recent med list.                   Brand Name Dispense Instructions for use    amphetamine-dextroamphetamine 30 MG per 24 hr capsule    ADDERALL XR    30 capsule    Take 1 capsule (30 mg) by mouth daily       atenolol 25 MG tablet    TENORMIN    30 tablet    Take 1 tablet (25 mg) by mouth daily       cetirizine 10 MG tablet    zyrTEC    30 tablet    Take 1 tablet (10 mg) by mouth every evening       clindamycin 300 MG capsule    CLEOCIN    28 capsule    Take 1 capsule (300 mg) by mouth every 6 hours for 7 days       [employer]S PROBIOTIC (LACTOBACILLUS) Caps     21 capsule    Take 1 capsule by mouth 2 times daily       hydrochlorothiazide 25 MG tablet    HYDRODIURIL    30 tablet    Take 1 tablet (25 mg) by mouth daily       LASIX 20 MG tablet   Generic  drug:  furosemide     30 tablet    Take 1 tablet (20 mg) by mouth 2 times daily       levothyroxine 175 MCG tablet    SYNTHROID/LEVOTHROID    30 tablet    Take 1 tablet (175 mcg) by mouth daily       omeprazole 20 MG CR capsule    priLOSEC    30 capsule    Take 1 capsule (20 mg) by mouth daily       order for DME     1 Box    Knee brace

## 2017-06-13 NOTE — PROGRESS NOTES
HPI:       Phan Eduardo is a 51 year old who presents for the following  Patient presents with:  RECHECK: infection on left lower leg/ red spots are still present      Patient presents for follow-up from visit on 6/5/17 where he was seen for cellulitis follow-up, right knee pain, elevated creatinine.  Patient was switched to Clindamycin from doxycycline for possible MRSA coverage.  Patient states that he has been taking Clindamycin 4 times daily, today is his last day.  He reports that the pain and redness on left ankle has resolved.  Has been wearing his compression stockings.  Swelling has improved.  Furosemide was increased to 20 mg two times daily, which he was worked well for diuresis - he has noticed an increase in diuresis after taking his pills.        Patient requesting refill of Tylenol and Jobst compression stockings.      Has not started pool therapy for right knee pain, states that they didn't receive order.        Problem, Medication and Allergy Lists were reviewed and are current.  Patient is an established patient of this clinic.         Review of Systems:   Review of Systems   Constitutional: Negative.    Respiratory: Negative.    Cardiovascular: Negative.    Gastrointestinal: Negative.    Musculoskeletal: Positive for arthralgias (right knee pain).   Skin:        See HPI             Physical Exam:   Patient Vitals for the past 24 hrs:   BP Temp Temp src Pulse Resp SpO2 Weight   06/12/17 1544 113/87 97.6  F (36.4  C) Oral 72 16 94 % (!) 301 lb 3.2 oz (136.6 kg)     Body mass index is 42.61 kg/(m^2).  Vitals were reviewed and were normal     Physical Exam   Constitutional: He is oriented to person, place, and time. He appears well-nourished. No distress.   Cardiovascular: Normal rate and regular rhythm.    Pulmonary/Chest: Effort normal and breath sounds normal.   Musculoskeletal: He exhibits no edema.   Neurological: He is alert and oriented to person, place, and time.   Skin:   Left lower  extremity with no erythema, warmth or swelling, +venous insufficiency changes visible, +varicose veins, +superficial thrombophlebitis   Psychiatric: He has a normal mood and affect.       Assessment and Plan       Phan was seen today for recheck.    Diagnoses and all orders for this visit:      Bilateral edema of lower extremity  Continue furosemide, 20 mg 2 times daily. Re-evaluate at next follow-up visit.   Jobst stocking prescription written for patient.  Encouraged consistent use.     Cellulitis of left lower extremity - resolved  Monitor for s/s of infection.   Compression stockings to improved circulation, prevent infection.      Essential hypertension  BMP rechecked.  Creatinine, urea nitrogen within normal limits.         Acute pain of right knee  Care coordination to check status of pool therapy order that was previously ordered by Dr. Gomez on 6/6/17.      Follow-up in 2-3 months, sooner as needed.     Options for treatment and follow-up care were reviewed with the patient. Phan KASPER [place]  engaged in the decision making process and verbalized understanding of the options discussed and agreed with the final plan.    Kassy Hidalgo, PINKY CNP

## 2017-06-22 ENCOUNTER — DOCUMENTATION ONLY (OUTPATIENT)
Dept: FAMILY MEDICINE | Facility: CLINIC | Age: 52
End: 2017-06-22

## 2017-06-22 NOTE — PROGRESS NOTES
"When opening a documentation only encounter, be sure to enter in \"Chief Complaint\" Forms and in \" Comments\" Title of form, description if needed.    Phan is a 51 year old  male  Form received via: Fax  Form now resides in: Provider Ready    Luzma Pulliam                Form has been completed by provider.     Form sent out via: Fax to [employer] at Fax Number: [phone]  Patient informed: n/a  Output date: June 27, 2017    Luzma Pulliam      **Please close the encounter**      "

## 2017-06-23 ENCOUNTER — TELEPHONE (OUTPATIENT)
Dept: SLEEP MEDICINE | Facility: CLINIC | Age: 52
End: 2017-06-23

## 2017-06-23 NOTE — TELEPHONE ENCOUNTER
Called patient and left generic VM asking for call back.     Patient needs follow up visit with Dr. Briscoe. Dr. Roy signed CPAP orders for patient and noted his need for a follow up.     Scheduling letter was also sent.     ANGELIC Triplett  Clinic Coordinator   Registered Medical Assistant   [facility]

## 2017-06-23 NOTE — LETTER
[facility], [place], SLEEP STUDY  [address] [facility] [address]  Phone: [phone]        June 23, 2017      Phan Eduardo                                                                                                                                [address] [facility] [address]            Dear Mr. Eduardo,    We are concerned about your health care.  We recently provided a new prescription for CPAP supplies.  In reviewing your chart it was noted you need a follow up visit for CPAP compliance 31-91 days after beginning CPAP.       At this time we ask that: You schedule a routine office visit with your physician to follow your Sleep Concerns      Thank you,      ANGELIC Triplett  Clinic Coordinator   Registered Medical Assistant   [facility]

## 2017-06-29 ENCOUNTER — DOCUMENTATION ONLY (OUTPATIENT)
Dept: SLEEP MEDICINE | Facility: CLINIC | Age: 52
End: 2017-06-29

## 2017-06-29 DIAGNOSIS — G47.33 OSA TREATED WITH BIPAP: ICD-10-CM

## 2017-06-29 DIAGNOSIS — R09.02 HYPOXEMIA: ICD-10-CM

## 2017-06-29 NOTE — NURSING NOTE
Results received from [facility] for overnight oximetry that was faxed on 05/30/2017.     Results labeled and scanned into chart.     Copy given to provider for review. Encounter also forwarded to provider.     Recording Date: 06/27/2017    Duration: 6:02:32    Note: Completed on Current PAP Settings.

## 2017-07-05 NOTE — NURSING NOTE
Second message left by writer today 7/05 asking pt to call back to discuss testing ordered by Dr. Briscoe.  This would be the third attempt to reach pt.    ANGELIC Barnett

## 2017-07-14 ENCOUNTER — TELEPHONE (OUTPATIENT)
Dept: FAMILY MEDICINE | Facility: CLINIC | Age: 52
End: 2017-07-14

## 2017-07-14 DIAGNOSIS — F98.8 ADD (ATTENTION DEFICIT DISORDER): ICD-10-CM

## 2017-07-14 DIAGNOSIS — F98.8 ATTENTION DEFICIT DISORDER, UNSPECIFIED HYPERACTIVITY PRESENCE: ICD-10-CM

## 2017-07-14 RX ORDER — DEXTROAMPHETAMINE SACCHARATE, AMPHETAMINE ASPARTATE MONOHYDRATE, DEXTROAMPHETAMINE SULFATE AND AMPHETAMINE SULFATE 7.5; 7.5; 7.5; 7.5 MG/1; MG/1; MG/1; MG/1
30 CAPSULE, EXTENDED RELEASE ORAL DAILY
Qty: 30 CAPSULE | Refills: 0 | Status: CANCELLED | OUTPATIENT
Start: 2017-07-14

## 2017-07-14 RX ORDER — DEXTROAMPHETAMINE SACCHARATE, AMPHETAMINE ASPARTATE MONOHYDRATE, DEXTROAMPHETAMINE SULFATE AND AMPHETAMINE SULFATE 7.5; 7.5; 7.5; 7.5 MG/1; MG/1; MG/1; MG/1
30 CAPSULE, EXTENDED RELEASE ORAL DAILY
Qty: 30 CAPSULE | Refills: 0 | Status: SHIPPED | OUTPATIENT
Start: 2017-07-14 | End: 2017-08-28

## 2017-07-14 NOTE — TELEPHONE ENCOUNTER
Request for medication refill:    Date of last visit at clinic: 6/5/17    Please complete refill if appropriate and CLOSE ENCOUNTER.    Closing the encounter signifies the refill is complete.    If refill has been denied, please complete the smart phrase .smirefuse and route it to the [facility] RN TRIAGE pool to inform the patient and the pharmacy.    Tatiana Hernanedz

## 2017-07-14 NOTE — TELEPHONE ENCOUNTER
[facility] phone call message- patient requesting a refill:    Full Medication Name: amphetamine-dextroamphetamine (ADDERALL XR) 30 MG     Dose: 30mg    Pharmacy confirmed as   [facility] [place], [place] - [address]  [facility] [address]  Phone: [phone] Fax: [phone]  : Yes    Additional Comments: Pt would like to get at least  a 3 month refill on this medication.      OK to leave a message on voice mail? Yes    Primary language: English      needed? No    Call taken on July 14, 2017 at 9:21 AM by Shae [facility]

## 2017-07-14 NOTE — TELEPHONE ENCOUNTER
Prescription printed by Dr. Kesha Toney (covering for J Meixl). RN walked script to [facility].    Stacy Preston RN

## 2017-07-31 DIAGNOSIS — M79.89 LEG SWELLING: ICD-10-CM

## 2017-07-31 RX ORDER — FUROSEMIDE 20 MG
20 TABLET ORAL 2 TIMES DAILY
Qty: 30 TABLET | Refills: 1 | Status: SHIPPED | OUTPATIENT
Start: 2017-07-31 | End: 2017-09-01

## 2017-07-31 NOTE — TELEPHONE ENCOUNTER
Request for medication refill:    Date of last visit at clinic: 6-12-17    Please complete refill if appropriate and CLOSE ENCOUNTER.    Closing the encounter signifies the refill is complete.    If refill has been denied, please complete the smart phrase .smirefuse and route it to the [facility] RN TRIAGE pool to inform the patient and the pharmacy.    Mariela Gallardo MA

## 2017-08-07 ENCOUNTER — ALLIED HEALTH/NURSE VISIT (OUTPATIENT)
Dept: FAMILY MEDICINE | Facility: CLINIC | Age: 52
End: 2017-08-07

## 2017-08-07 ENCOUNTER — OFFICE VISIT (OUTPATIENT)
Dept: FAMILY MEDICINE | Facility: CLINIC | Age: 52
End: 2017-08-07

## 2017-08-07 VITALS
SYSTOLIC BLOOD PRESSURE: 108 MMHG | DIASTOLIC BLOOD PRESSURE: 70 MMHG | HEIGHT: 71 IN | OXYGEN SATURATION: 93 % | TEMPERATURE: 97.8 F | RESPIRATION RATE: 18 BRPM | HEART RATE: 78 BPM | WEIGHT: 297.2 LBS | BODY MASS INDEX: 41.61 KG/M2

## 2017-08-07 DIAGNOSIS — Z11.59 NEED FOR HEPATITIS C SCREENING TEST: ICD-10-CM

## 2017-08-07 DIAGNOSIS — Z11.1 SCREENING EXAMINATION FOR PULMONARY TUBERCULOSIS: Primary | ICD-10-CM

## 2017-08-07 DIAGNOSIS — Z13.220 SCREENING FOR LIPOID DISORDERS: Primary | ICD-10-CM

## 2017-08-07 DIAGNOSIS — M79.89 LEFT LEG SWELLING: ICD-10-CM

## 2017-08-07 DIAGNOSIS — Z11.59 NEED FOR HEPATITIS B SCREENING TEST: ICD-10-CM

## 2017-08-07 PROBLEM — E03.9 HYPOTHYROIDISM, UNSPECIFIED TYPE: Status: RESOLVED | Noted: 2017-04-06 | Resolved: 2017-08-07

## 2017-08-07 LAB
CHOLEST SERPL-MCNC: 208 MG/DL
HBV SURFACE AB SERPL IA-ACNC: 123.8 M[IU]/ML
HBV SURFACE AG SERPL QL IA: NONREACTIVE
HCV AB SERPL QL IA: NORMAL
HDLC SERPL-MCNC: 38 MG/DL
LDLC SERPL CALC-MCNC: 133 MG/DL
NONHDLC SERPL-MCNC: 170 MG/DL
TRIGL SERPL-MCNC: 188 MG/DL

## 2017-08-07 ASSESSMENT — ENCOUNTER SYMPTOMS
RESPIRATORY NEGATIVE: 1
DIZZINESS: 0
AGITATION: 0
WEAKNESS: 0
ACTIVITY CHANGE: 0
COLOR CHANGE: 1
FATIGUE: 0
DYSPHORIC MOOD: 0
CARDIOVASCULAR NEGATIVE: 1
FEVER: 0
SLEEP DISTURBANCE: 0
APPETITE CHANGE: 0

## 2017-08-07 NOTE — MR AVS SNAPSHOT
After Visit Summary   8/7/2017    Phan Eduardo    MRN: [id number]           Patient Information     Date Of Birth          1965        Visit Information        Provider Department      8/7/2017 10:40 AM Brooke Lundberg MD [facility]        Today's Diagnoses     Screening for lipoid disorders    -  1    Need for hepatitis B screening test        Need for hepatitis C screening test        Left leg swelling          Care Instructions    Here is the plan from today's visit    1. Screening for lipoid disorders  - Lipid panel reflex to direct LDL    2. Need for hepatitis B screening test  - Hepatitis B Surface Antibody  - Hepatitis B surface antigen    3. Need for hepatitis C screening test  - Hepatitis C antibody    4. Left leg swelling  Testing of arterial and venous sufficiency in L leg and then follow up with Kassy Hidalgo  - US Lower Extremity Venous Duplex Left; Future  - XR KARLOS Exercise w & wo; Future      Please call or return to clinic if your symptoms don't go away.    Follow up plan - follow up appointment after studies are done    Thank you for coming to [facility] today.  Lab Testing:  **If you had lab testing today and your results are reassuring or normal they will be mailed to you or sent through [employer] within 7 days.   **If the lab tests need quick action we will call you with the results.  The phone number we will call with results is # [phone] (home) . If this is not the best number please call our clinic and change the number.  Medication Refills:  If you need any refills please call your pharmacy and they will contact us.   If you need to  your refill at a new pharmacy, please contact the new pharmacy directly. The new pharmacy will help you get your medications transferred faster.   Scheduling:  If you have any concerns about today's visit or wish to schedule another appointment please call our office during normal business hours  [phone] (8-5:00 M-F)  If a referral was made to a [facility] and you don't get a call from central scheduling please call [phone].  If a Mammogram was ordered for you at [facility] call [phone] to schedule or change your appointment.  If you had an XRay/CT/Ultrasound/MRI ordered the number is [phone] to schedule or change your radiology appointment.   Medical Concerns:  If you have urgent medical concerns please call [phone] at any time of the day.  If you have a medical emergency please call 112.            Follow-ups after your visit        Your next 10 appointments already scheduled     Aug 09, 2017 11:00 AM CDT   Nurse Visit with [facility] Nurse   [facility] ([facility])    [address] [place] [address],  [address]  [place] [address]   [phone]              Future tests that were ordered for you today     Open Future Orders        Priority Expected Expires Ordered    US Lower Extremity Venous Duplex Left Routine  8/7/2018 8/7/2017    XR KARLOS Exercise w & wo Routine 8/7/2017 8/7/2018 8/7/2017            Who to contact     Please call your clinic at [phone] to:    Ask questions about your health    Make or cancel appointments    Discuss your medicines    Learn about your test results    Speak to your doctor   If you have compliments or concerns about an experience at your clinic, or if you wish to file a complaint, please contact [facility] Patient Relations at [phone] or email us at [email or web address]         Additional Information About Your Visit        Prizedhart Information     [employer] is an electronic gateway that provides easy, online access to your medical records. With [employer], you can request a clinic appointment, read your test results, renew a prescription or communicate with your care team.     To sign up for SpeechVivet visit the website at [email or web address]  "  You will be asked to enter the access code listed below, as well as some personal information. Please follow the directions to create your username and password.     Your access code is: [email or web address]  Expires: 2017 11:27 AM     Your access code will  in 90 days. If you need help or a new code, please contact your [facility] or call [phone] for assistance.        Care EveryWhere ID     This is your Care EveryWhere ID. This could be used by other organizations to access your [place] medical records  [id number]        Your Vitals Were     Pulse Temperature Respirations Height Pulse Oximetry BMI (Body Mass Index)    78 97.8  F (36.6  C) (Oral) 18 5' 10.5\" (179.1 cm) 93% 42.04 kg/m2       Blood Pressure from Last 3 Encounters:   17 108/70   17 113/87   17 (!) 145/97    Weight from Last 3 Encounters:   17 297 lb 3.2 oz (134.8 kg)   17 (!) 301 lb 3.2 oz (136.6 kg)   17 (!) 304 lb (137.9 kg)              We Performed the Following     Hepatitis B Surface Antibody     Hepatitis B surface antigen     Hepatitis C antibody     Lipid panel reflex to direct LDL        Primary Care Provider Office Phone # Fax #    Kassy Hidalgo, APRN [facility] [phone]       [facility]  [address]  [facility] [address]        Equal Access to Services     JOSE CESPEDES : Hadii aad ku hadasho Soomaali, waaxda luqadaha, qaybta kaalmada adeegyada, oliverio courtney . So [facility] [phone].    ATENCIÓN: Si habla juana, tiene a arguello disposición servicios gratuitos de asistencia lingüística. Llame al [phone].    We comply with applicable federal civil rights laws and [place] laws. We do not discriminate on the basis of race, color, national origin, age, disability sex, sexual orientation or gender identity.            Thank you!     Thank you for choosing [facility]  for your care. Our goal is " always to provide you with excellent care. Hearing back from our patients is one way we can continue to improve our services. Please take a few minutes to complete the written survey that you may receive in the mail after your visit with us. Thank you!             Your Updated Medication List - Protect others around you: Learn how to safely use, store and throw away your medicines at www.disposemymeds.org.          This list is accurate as of: 8/7/17 11:27 AM.  Always use your most recent med list.                   Brand Name Dispense Instructions for use Diagnosis    amphetamine-dextroamphetamine 30 MG per 24 hr capsule    ADDERALL XR    30 capsule    Take 1 capsule (30 mg) by mouth daily    Attention deficit disorder, unspecified hyperactivity presence       atenolol 25 MG tablet    TENORMIN    30 tablet    Take 1 tablet (25 mg) by mouth daily    Essential hypertension, benign       cetirizine 10 MG tablet    zyrTEC    30 tablet    Take 1 tablet (10 mg) by mouth every evening    Dysfunction of eustachian tube, bilateral       CVS PROBIOTIC (LACTOBACILLUS) Caps     21 capsule    Take 1 capsule by mouth 2 times daily    Cellulitis of left lower extremity       furosemide 20 MG tablet    LASIX    30 tablet    Take 1 tablet (20 mg) by mouth 2 times daily    Leg swelling       hydrochlorothiazide 25 MG tablet    HYDRODIURIL    30 tablet    Take 1 tablet (25 mg) by mouth daily    Essential hypertension, benign       levothyroxine 175 MCG tablet    SYNTHROID/LEVOTHROID    30 tablet    Take 1 tablet (175 mcg) by mouth daily    Hypothyroidism, unspecified type       omeprazole 20 MG CR capsule    priLOSEC    30 capsule    Take 1 capsule (20 mg) by mouth daily    Gastroesophageal reflux disease, esophagitis presence not specified       order for DME     1 Box    Knee brace    Acute pain of right knee

## 2017-08-07 NOTE — PROGRESS NOTES
"      HPI:       Phan Eduardo is a 51 year old who presents for the following  Patient presents with:  Labs Only: Hep B Titers for work/School        Concern: Needs Hep B Titers   Description of the problem :Needs Hep B titers drawn for work/School   On the job training  - building maintenance  - requirement for titers for a few things  - already had Td booster  - Mantoux done today  - Titers also need for Hep B only    L LE swelling  - h/o fracture (tib/fib) about 35 years ago  - h/o treatment for cellulitis this year L ankle  - noting discoloration and increased swelling and pain (sometimes with walking - and this happens bilaterally)  - still smoking cigarettes (trying to quit)  - no recent cocaine use    Other screening  - due for Hep C and lipids     Problem, Medication and Allergy Lists were reviewed and are current.  Patient is an established patient of this clinic.         Review of Systems:   Review of Systems   Constitutional: Negative for activity change, appetite change, fatigue and fever.   Respiratory: Negative.    Cardiovascular: Negative.    Skin: Positive for color change (some areas of darkening on L LE (around the ankle)).        Edema - worse on the LLE   Neurological: Negative for dizziness and weakness.   Psychiatric/Behavioral: Negative for agitation, dysphoric mood and sleep disturbance.             Physical Exam:   Patient Vitals for the past 24 hrs:   BP Temp Temp src Pulse Resp SpO2 Height Weight   08/07/17 1046 108/70 97.8  F (36.6  C) Oral 78 18 93 % 5' 10.5\" (179.1 cm) 297 lb 3.2 oz (134.8 kg)     Body mass index is 42.04 kg/(m^2).  Vitals were reviewed and were normal     Physical Exam   Constitutional: He is oriented to person, place, and time. He appears well-developed and well-nourished.   Pulmonary/Chest: Effort normal.   Musculoskeletal: Normal range of motion. He exhibits edema (LLE 2+ edema to mid shin).   Neurological: He is alert and oriented to person, place, and time. "   Skin:   Hyperpigmentation and some areas of erythema around the L ankle; some varicosities; no ulceration   Psychiatric: He has a normal mood and affect. His behavior is normal. Judgment and thought content normal.         Results:     Results pending    Assessment and Plan     Patient Instructions   Here is the plan from today's visit    1. Screening for lipoid disorders  - Lipid panel reflex to direct LDL    2. Need for hepatitis B screening test  - Hepatitis B Surface Antibody  - Hepatitis B surface antigen    3. Need for hepatitis C screening test  - Hepatitis C antibody    4. Left leg swelling  Testing of arterial and venous sufficiency in L leg and then follow up with Kassy Hidalgo to decide on next steps (lymphedema therapy vs. Vascular surgery or cardiac rehab)  - US Lower Extremity Venous Duplex Left; Future  - XR KARLOS Exercise w & wo; Future      Please call or return to clinic if your symptoms don't go away.    Follow up plan - follow up appointment after studies are done    There are no discontinued medications.  Options for treatment and follow-up care were reviewed with the patient. Phan Eduardo  engaged in the decision making process and verbalized understanding of the options discussed and agreed with the final plan.    Brooke Lundberg MD

## 2017-08-07 NOTE — NURSING NOTE
Mantoux/PPD screening questions    1. Have you had recent contact with a person with active Tuberculosis (TB)? NO  2. Have you had this type of test before? Yes. Have you ever had a skin reaction to the test? NO  Have you ever been told this test was positive? NO  3. Have you had a live vaccine (Smallpox, Flumist, MMR, Varicella, Oral Polio, or Yellow Fever) in the past 4 weeks? NO  4. Have you ever received the Bacillus Calmette-Nikkie (BCG) vaccine for Tuberculosis? NO  5. Have you ever been treated for Tuberculosis before? NO    Patient is eligible for a PPD test.  I placed the PPD on the left forearm.  I advised patient to avoid scratching the area and to return to the clinic in 48-72 hours for interpretation. Dr. Valle was onsite at the time of placement.    Luzma Pulliam MA

## 2017-08-07 NOTE — LETTER
August 19, 2017      Phan Eduardo  [address] [id number]        Dear Phan,    Thank you for getting your care at [facility]. Please see below for your test results.  They do show that you've been immunized for Hep B.  Also the Hep C testing is negative.  Your cholesterol is on the border, we can repeat it in a year.  Focus on a healthy diet and regular exercise to help keep cholesterol lowered.    Resulted Orders   Hepatitis C antibody   Result Value Ref Range    Hepatitis C Antibody  NR     Nonreactive   Assay performance characteristics have not been established for newborns,   infants, and children     Hepatitis B Surface Antibody   Result Value Ref Range    Hepatitis B Surface Antibody 123.80 (H) <8.00 m[IU]/mL      Comment:      Reactive, Patient is considered to be immune to infection with hepatitis B   when   the value is greater than or equal to 12.0 m[IU]/mL.     Hepatitis B surface antigen   Result Value Ref Range    Hep B Surface Agn Nonreactive NR   Lipid panel reflex to direct LDL   Result Value Ref Range    Cholesterol 208 (H) <200 mg/dL      Comment:      Desirable:       <200 mg/dl    Triglycerides 188 (H) <150 mg/dL      Comment:      Borderline high:  150-199 mg/dl   High:             200-499 mg/dl   Very high:       >499 mg/dl      HDL Cholesterol 38 (L) >39 mg/dL    LDL Cholesterol Calculated 133 (H) <100 mg/dL      Comment:      Above desirable:  100-129 mg/dl   Borderline High:  130-159 mg/dL   High:             160-189 mg/dL   Very high:       >189 mg/dl      Non HDL Cholesterol 170 (H) <130 mg/dL      Comment:      Above Desirable:  130-159 mg/dl   Borderline high:  160-189 mg/dl   High:             190-219 mg/dl   Very high:       >219 mg/dl         If you have any concerns about these results please call and leave a message for me or send a MyCSiOnyxt message to the clinic.    Sincerely,    Brooke Lundberg MD

## 2017-08-07 NOTE — MR AVS SNAPSHOT
After Visit Summary   2017    Phan Eduardo    MRN: [id number]           Patient Information     Date Of Birth          1965        Visit Information        Provider Department      2017 10:00 AM [facility]        Today's Diagnoses     Screening examination for pulmonary tuberculosis    -  1       Follow-ups after your visit        Who to contact     Please call your clinic at [phone] to:    Ask questions about your health    Make or cancel appointments    Discuss your medicines    Learn about your test results    Speak to your doctor   If you have compliments or concerns about an experience at your clinic, or if you wish to file a complaint, please contact [facility] Patient Relations at [phone] or email us at [email or web address]         Additional Information About Your Visit        MyChart Information     [employer] is an electronic gateway that provides easy, online access to your medical records. With [employer], you can request a clinic appointment, read your test results, renew a prescription or communicate with your care team.     To sign up for ClassifEyet visit the website at [email or web address]   You will be asked to enter the access code listed below, as well as some personal information. Please follow the directions to create your username and password.     Your access code is: [email or web address]  Expires: 2017 11:27 AM     Your access code will  in 90 days. If you need help or a new code, please contact your [facility] or call [phone] for assistance.        Care EveryWhere ID     This is your Care EveryWhere ID. This could be used by other organizations to access your [place] medical records  [id number]         Blood Pressure from Last 3 Encounters:   17 108/70   17 113/87   17 (!) 145/97    Weight from Last 3 Encounters:    08/07/17 297 lb 3.2 oz (134.8 kg)   06/12/17 (!) 301 lb 3.2 oz (136.6 kg)   06/06/17 (!) 304 lb (137.9 kg)              We Performed the Following     INJECTION INTRAMUSCULAR OR SUB-Q     tuberculin (Mantoux, PPD) 5 UNIT/0.1ML ID injection (Charge)        Primary Care Provider Office Phone # Fax #    Kassy Hidalgo, PINKY [facility] [phone]       [address] [facility] [place] [address]        Equal Access to Services     [facility] HospitalOL[facility] : Hadii elba garcia hadasho Soomaali, waaxda luqadaha, qaybta kaalmada rohan, oliverio courtney . So [facility] [phone].    ATENCIÓN: Si habla español, tiene a arguello disposición servicios gratuitos de asistencia lingüística. [facility] [phone].    We comply with applicable federal civil rights laws and [place] laws. We do not discriminate on the basis of race, color, national origin, age, disability sex, sexual orientation or gender identity.            Thank you!     Thank you [facility]  for your care. Our goal is always to provide you with excellent care. Hearing back from our patients is one way we can continue to improve our services. Please take a few minutes to complete the written survey that you may receive in the mail after your visit with us. Thank you!             Your Updated Medication List - Protect others around you: Learn how to safely use, store and throw away your medicines at www.disposemymeds.org.          This list is accurate as of: 8/7/17 11:59 PM.  Always use your most recent med list.                   Brand Name Dispense Instructions for use Diagnosis    amphetamine-dextroamphetamine 30 MG per 24 hr capsule    ADDERALL XR    30 capsule    Take 1 capsule (30 mg) by mouth daily    Attention deficit disorder, unspecified hyperactivity presence       atenolol 25 MG tablet    TENORMIN    30 tablet    Take 1 tablet (25 mg) by mouth daily    Essential hypertension, benign       cetirizine 10 MG  tablet    zyrTEC    30 tablet    Take 1 tablet (10 mg) by mouth every evening    Dysfunction of eustachian tube, bilateral       CVS PROBIOTIC (LACTOBACILLUS) Caps     21 capsule    Take 1 capsule by mouth 2 times daily    Cellulitis of left lower extremity       furosemide 20 MG tablet    LASIX    30 tablet    Take 1 tablet (20 mg) by mouth 2 times daily    Leg swelling       hydrochlorothiazide 25 MG tablet    HYDRODIURIL    30 tablet    Take 1 tablet (25 mg) by mouth daily    Essential hypertension, benign       levothyroxine 175 MCG tablet    SYNTHROID/LEVOTHROID    30 tablet    Take 1 tablet (175 mcg) by mouth daily    Hypothyroidism, unspecified type       omeprazole 20 MG CR capsule    priLOSEC    30 capsule    Take 1 capsule (20 mg) by mouth daily    Gastroesophageal reflux disease, esophagitis presence not specified       order for DME     1 Box    Knee brace    Acute pain of right knee

## 2017-08-09 ENCOUNTER — ALLIED HEALTH/NURSE VISIT (OUTPATIENT)
Dept: FAMILY MEDICINE | Facility: CLINIC | Age: 52
End: 2017-08-09

## 2017-08-09 DIAGNOSIS — Z11.1 SCREENING FOR TUBERCULOSIS: Primary | ICD-10-CM

## 2017-08-09 NOTE — MR AVS SNAPSHOT
After Visit Summary   2017    Phan Eduardo    MRN: [id number]           Patient Information     Date Of Birth          1965        Visit Information        Provider Department      2017 11:00 AM [facility]        Today's Diagnoses     Screening for tuberculosis    -  1       Follow-ups after your visit        Who to contact     Please call your clinic at [phone] to:    Ask questions about your health    Make or cancel appointments    Discuss your medicines    Learn about your test results    Speak to your doctor   If you have compliments or concerns about an experience at your clinic, or if you wish to file a complaint, please contact [facility] Patient Relations at [phone] or email us at [email or web address]         Additional Information About Your Visit        MyChart Information     [employer] is an electronic gateway that provides easy, online access to your medical records. With [employer], you can request a clinic appointment, read your test results, renew a prescription or communicate with your care team.     To sign up for [employer] visit the website at [email or web address]   You will be asked to enter the access code listed below, as well as some personal information. Please follow the directions to create your username and password.     Your access code is: [email or web address]  Expires: 2017 11:27 AM     Your access code will  in 90 days. If you need help or a new code, please contact your [facility] or call [phone] for assistance.        Care EveryWhere ID     This is your Care EveryWhere ID. This could be used by other organizations to access your [place] medical records  [id number]         Blood Pressure from Last 3 Encounters:   17 108/70   17 113/87   17 (!) 145/97    Weight from Last 3 Encounters:   17 297 lb 3.2 oz  (134.8 kg)   06/12/17 (!) 301 lb 3.2 oz (136.6 kg)   06/06/17 (!) 304 lb (137.9 kg)              Today, you had the following     No orders found for display       Primary Care Provider Office Phone # Fax PINKY Payne [phone]       [address]  [facility] [address]        Equal Access to Services     JOSE CESPEDES : Hadii aad ku hadasho Soomaali, waaxda luqadaha, qaybta kaalmada adeegyada, waxay idiin hayaan adeeg ninadolorescelestina courtney . So [facility] [phone].    ATENCIÓN: Si bryan arias, tiene a arguello disposición servicios gratuitos de asistencia lingüística. Llame al [phone].    We comply with applicable federal civil rights laws and [place] laws. We do not discriminate on the basis of race, color, national origin, age, disability sex, sexual orientation or gender identity.            Thank you!     Thank you for choosing [facility]  for your care. Our goal is always to provide you with excellent care. Hearing back from our patients is one way we can continue to improve our services. Please take a few minutes to complete the written survey that you may receive in the mail after your visit with us. Thank you!             Your Updated Medication List - Protect others around you: Learn how to safely use, store and throw away your medicines at www.disposemymeds.org.          This list is accurate as of: 8/9/17 11:31 AM.  Always use your most recent med list.                   Brand Name Dispense Instructions for use Diagnosis    amphetamine-dextroamphetamine 30 MG per 24 hr capsule    ADDERALL XR    30 capsule    Take 1 capsule (30 mg) by mouth daily    Attention deficit disorder, unspecified hyperactivity presence       atenolol 25 MG tablet    TENORMIN    30 tablet    Take 1 tablet (25 mg) by mouth daily    Essential hypertension, benign       cetirizine 10 MG tablet    zyrTEC    30 tablet    Take 1 tablet (10 mg) by mouth every evening    Dysfunction of  eustachian tube, bilateral       CVS PROBIOTIC (LACTOBACILLUS) Caps     21 capsule    Take 1 capsule by mouth 2 times daily    Cellulitis of left lower extremity       furosemide 20 MG tablet    LASIX    30 tablet    Take 1 tablet (20 mg) by mouth 2 times daily    Leg swelling       hydrochlorothiazide 25 MG tablet    HYDRODIURIL    30 tablet    Take 1 tablet (25 mg) by mouth daily    Essential hypertension, benign       levothyroxine 175 MCG tablet    SYNTHROID/LEVOTHROID    30 tablet    Take 1 tablet (175 mcg) by mouth daily    Hypothyroidism, unspecified type       omeprazole 20 MG CR capsule    priLOSEC    30 capsule    Take 1 capsule (20 mg) by mouth daily    Gastroesophageal reflux disease, esophagitis presence not specified       order for DME     1 Box    Knee brace    Acute pain of right knee

## 2017-08-09 NOTE — NURSING NOTE
[facility]  [address] [address]  Phone: [phone]  Fax: [phone]    8/9/2017  11:28 AM    Phan Eduardo  1965  [address] [place] [id number]      Mantoux Results:   MANTOUX RESULTS    No results found for: PPDREDNESS  No results found for: PPDINDURATIO    Results Interpretation:  This test is negative.      An induration of 10 or more millimeters is considered positive in ANY patient.     An induration of 5 or more millimeters is considered positive in  -HIV-infected persons  -A recent contact of a person with TB disease  -Persons with fibrotic changes on chest radiograph consistent with prior TB  -Patients with organ transplants  -Persons who are immunosuppressed for other reasons (e.g., taking the equivalent of >15 mg/day of prednisone for 1 month or longer, taking TNF-a antagonists)       Luzma Pulliam MA

## 2017-08-09 NOTE — NURSING NOTE
Patient here for PPD read.  Results can be found in original placement encounter.    Luzma Pulliam MA

## 2017-08-28 DIAGNOSIS — F98.8 ATTENTION DEFICIT DISORDER, UNSPECIFIED HYPERACTIVITY PRESENCE: ICD-10-CM

## 2017-08-28 RX ORDER — DEXTROAMPHETAMINE SACCHARATE, AMPHETAMINE ASPARTATE MONOHYDRATE, DEXTROAMPHETAMINE SULFATE AND AMPHETAMINE SULFATE 7.5; 7.5; 7.5; 7.5 MG/1; MG/1; MG/1; MG/1
30 CAPSULE, EXTENDED RELEASE ORAL DAILY
Qty: 30 CAPSULE | Refills: 0 | Status: SHIPPED | OUTPATIENT
Start: 2017-08-28

## 2017-08-28 NOTE — TELEPHONE ENCOUNTER
adderall xr 30mg        Last Written Prescription Date:  07-  Last Fill Quantity: 30,   # refills: 0  Last Office Visit with [facility], [facility] or [facility] prescribing provider: 08-  Future Office visit:       Routing refill request to provider for review/approval because:  Drug not on the [facility], [facility] or [facility] refill protocol or controlled substance    William HOFF CPhT @    [facility]  [address] [place] [address]  Phone: [phone]  Fax: [phone]

## 2017-08-29 DIAGNOSIS — K21.9 GASTROESOPHAGEAL REFLUX DISEASE, ESOPHAGITIS PRESENCE NOT SPECIFIED: ICD-10-CM

## 2017-08-29 NOTE — TELEPHONE ENCOUNTER
Request for medication refill:    Date of last visit at clinic: 8-7-17    Please complete refill if appropriate and CLOSE ENCOUNTER.    Closing the encounter signifies the refill is complete.    If refill has been denied, please complete the smart phrase .smirefuse and route it to the [facility] RN TRIAGE pool to inform the patient and the pharmacy.    Luzma Pulliam

## 2017-09-01 ENCOUNTER — OFFICE VISIT (OUTPATIENT)
Dept: FAMILY MEDICINE | Facility: CLINIC | Age: 52
End: 2017-09-01

## 2017-09-01 VITALS
TEMPERATURE: 97.5 F | HEART RATE: 79 BPM | WEIGHT: 298 LBS | DIASTOLIC BLOOD PRESSURE: 96 MMHG | OXYGEN SATURATION: 96 % | RESPIRATION RATE: 18 BRPM | SYSTOLIC BLOOD PRESSURE: 147 MMHG | BODY MASS INDEX: 42.15 KG/M2

## 2017-09-01 DIAGNOSIS — R60.0 BILATERAL EDEMA OF LOWER EXTREMITY: ICD-10-CM

## 2017-09-01 DIAGNOSIS — I10 ESSENTIAL HYPERTENSION: ICD-10-CM

## 2017-09-01 DIAGNOSIS — L03.116 CELLULITIS OF LEFT LOWER EXTREMITY: Primary | ICD-10-CM

## 2017-09-01 DIAGNOSIS — M79.89 SWELLING OF LEFT LOWER EXTREMITY: ICD-10-CM

## 2017-09-01 ASSESSMENT — ENCOUNTER SYMPTOMS
CONSTITUTIONAL NEGATIVE: 1
RESPIRATORY NEGATIVE: 1
COLOR CHANGE: 1

## 2017-09-01 NOTE — PATIENT INSTRUCTIONS
Here is the plan from today's visit    1. Cellulitis of left lower extremity  Continue antibiotics as [place] [facility]      2. Essential hypertension    3. Swelling of left lower extremity  Schedule ultrasound and KARLOS to evaluate lower leg swelling      Follow-up in 2 weeks, sooner as needed.          Thank you for coming to [facility] today.  Lab Testing:  **If you had lab testing today and your results are reassuring or normal they will be mailed to you or sent through Algolux within 7 days.   **If the lab tests need quick action we will call you with the results.  The phone number we will call with results is # [phone] (home) . If this is not the best number please call our clinic and change the number.  Medication Refills:  If you need any refills please call your pharmacy and they will contact us.   If you need to  your refill at a new pharmacy, please contact the new pharmacy directly. The new pharmacy will help you get your medications transferred faster.   Scheduling:  If you have any concerns about today's visit or wish to schedule another appointment please call our office during normal business hours [phone] (8-5:00 M-F)  If a referral was made to a [facility] and you don't get a call from central scheduling please call [phone].  If a Mammogram was ordered for you at [facility] call [phone] to schedule or change your appointment.  If you had an XRay/CT/Ultrasound/MRI ordered the number is [phone] to schedule or change your radiology appointment.   Medical Concerns:  If you have urgent medical concerns please call [phone] at any time of the day.  If you have a medical emergency please call [phone].

## 2017-09-01 NOTE — MR AVS SNAPSHOT
After Visit Summary   9/1/2017    Phan Eduardo    MRN: [id number]           Patient Information     Date Of Birth          1965        Visit Information        Provider Department      9/1/2017 8:00 AM Kassy Hidalgo APRN CNP [facility]        Today's Diagnoses     Cellulitis of left lower extremity    -  1    Essential hypertension        Swelling of left lower extremity        Bilateral edema of lower extremity          Care Instructions    Here is the plan from today's visit    1. Cellulitis of left lower extremity  Continue antibiotics as prescribed by provider at [facility].      2. Essential hypertension    3. Swelling of left lower extremity  Schedule ultrasound and KARLOS to evaluate lower leg swelling      Follow-up in 2 weeks, sooner as needed.          Thank you for coming to [facility] today.  Lab Testing:  **If you had lab testing today and your results are reassuring or normal they will be mailed to you or sent through MyDocTime within 7 days.   **If the lab tests need quick action we will call you with the results.  The phone number we will call with results is # [phone] (home) . If this is not the best number please call our clinic and change the number.  Medication Refills:  If you need any refills please call your pharmacy and they will contact us.   If you need to  your refill at a new pharmacy, please contact the new pharmacy directly. The new pharmacy will help you get your medications transferred faster.   Scheduling:  If you have any concerns about today's visit or wish to schedule another appointment please call our office during normal business hours [phone] (8-5:00 M-F)  If a referral was made to a [facility] and you don't get a call from central scheduling please call [phone].  If a Mammogram was ordered for you at [facility] call [phone] to schedule or change your appointment.  If you had  an XRay/CT/Ultrasound/MRI ordered the number is [phone] to schedule or change your radiology appointment.   Medical Concerns:  If you have urgent medical concerns please call [phone] at any time of the day.  If you have a medical emergency please call 911.            Follow-ups after your visit        Your next 10 appointments already scheduled     Sep 05, 2017  5:30 PM CDT   US LOWER EXTREMITY VENOUS DUPLEX LEFT with UCUSV1   [facility] ([facility])    [address] [address] [phone]           Please bring a list of your medicines (including vitamins, minerals and over-the-counter drugs). Also, tell your doctor about any allergies you may have. Wear comfortable clothes and leave your valuables at home.  You do not need to do anything special to prepare for your exam.  Please call the Imaging Department at your exam site with any questions.            Sep 05, 2017  6:00 PM CDT   US KARLOS DOPPLER WITH EXERCISE with UCUSV1   [facility] ([facility])    [address] [address] [phone]           Please bring a list of your medicines (including vitamins, minerals and over-the-counter drugs). Also, tell your doctor about any allergies you may have. Wear comfortable clothes and leave your valuables at home.  No caffeine or tobacco for 1 hour prior to exam.  Please call the Imaging Department at your exam site with any questions.              Future tests that were ordered for you today     Open Future Orders        Priority Expected Expires Ordered    US KARLOS Doppler with Exercise Routine  9/1/2018 9/1/2017            Who to contact     Please call your clinic at [phone] to:    Ask questions about your health    Make or cancel appointments    Discuss your medicines    Learn about your test results    Speak to your doctor   If you have compliments or  concerns about an experience at your clinic, or if you wish to file a complaint, please contact [facility] Patient Relations at [phone] or email us at [email or web address]         Additional Information About Your Visit        Perfect Channel Information     Perfect Channel is an electronic gateway that provides easy, online access to your medical records. With Perfect Channel, you can request a clinic appointment, read your test results, renew a prescription or communicate with your care team.     To sign up for Perfect Channel visit the website at [email or web address]   You will be asked to enter the access code listed below, as well as some personal information. Please follow the directions to create your username and password.     Your access code is: [email or web address]  Expires: 2017 11:27 AM     Your access code will  in 90 days. If you need help or a new code, please contact your [facility] or call [phone] for assistance.        Care EveryWhere ID     This is your Care EveryWhere ID. This could be used by other organizations to access your [facility] medical records  [id number]        Your Vitals Were     Pulse Temperature Respirations Pulse Oximetry BMI (Body Mass Index)       79 97.5  F (36.4  C) (Oral) 18 96% 42.15 kg/m2        Blood Pressure from Last 3 Encounters:   17 (!) 153/106   17 108/70   17 113/87    Weight from Last 3 Encounters:   17 298 lb (135.2 kg)   17 297 lb 3.2 oz (134.8 kg)   17 (!) 301 lb 3.2 oz (136.6 kg)                 Today's Medication Changes          These changes are accurate as of: 17  8:42 AM.  If you have any questions, ask your nurse or doctor.               Stop taking these medicines if you haven't already. Please contact your care team if you have questions.     furosemide 20 MG tablet   Commonly known as:  LASIX   Stopped by:  Kassy Hidalgo APRN CNP                     Primary Care Provider Office Phone # Fax #    Kassy Hidalgo, APRN [facility] [phone]       [address]  [facility] [address]        Equal Access to Services     JOSE CESPEDES : Hadii aad ku hadrosa mjaret Mohit, waaxda luqadaha, qaybta kaalmada padminida, oliverio mayfield carmencitakristine doherty sherwin dickey. So [facility] [phone].    ATENCIÓN: Si habla español, tiene a arguello disposición servicios gratuitos de asistencia lingüística. Llame al [phone].    We comply with applicable federal civil rights laws and [place] laws. We do not discriminate on the basis of race, color, national origin, age, disability sex, sexual orientation or gender identity.            Thank you!     Thank you for choosing [facility]  for your care. Our goal is always to provide you with excellent care. Hearing back from our patients is one way we can continue to improve our services. Please take a few minutes to complete the written survey that you may receive in the mail after your visit with us. Thank you!             Your Updated Medication List - Protect others around you: Learn how to safely use, store and throw away your medicines at www.disposemymeds.org.          This list is accurate as of: 9/1/17  8:42 AM.  Always use your most recent med list.                   Brand Name Dispense Instructions for use Diagnosis    amphetamine-dextroamphetamine 30 MG per 24 hr capsule    ADDERALL XR    30 capsule    Take 1 capsule (30 mg) by mouth daily    Attention deficit disorder, unspecified hyperactivity presence       atenolol 25 MG tablet    TENORMIN    30 tablet    Take 1 tablet (25 mg) by mouth daily    Essential hypertension, benign       cetirizine 10 MG tablet    zyrTEC    30 tablet    Take 1 tablet (10 mg) by mouth every evening    Dysfunction of eustachian tube, bilateral       [employer]S PROBIOTIC (LACTOBACILLUS) Caps     21 capsule    Take 1 capsule by mouth 2 times daily    Cellulitis of left lower extremity        hydrochlorothiazide 25 MG tablet    HYDRODIURIL    30 tablet    Take 1 tablet (25 mg) by mouth daily    Essential hypertension, benign       levothyroxine 175 MCG tablet    SYNTHROID/LEVOTHROID    30 tablet    Take 1 tablet (175 mcg) by mouth daily    Hypothyroidism, unspecified type       omeprazole 20 MG CR capsule    priLOSEC    30 capsule    Take 1 capsule (20 mg) by mouth daily    Gastroesophageal reflux disease, esophagitis presence not specified       order for DME     1 Box    Knee brace    Acute pain of right knee

## 2017-09-01 NOTE — PROGRESS NOTES
HPI:       Phan Eduardo is a 51 year old who presents for the following  Patient presents with:  Forms: [facility] medical opinion  Refill Request  ,   Hypertension Follow-up      Outpatient blood pressures are not being checked.    Chest Pain? :No     Low Salt Diet: not monitoring salt    Daily NSAID Use?No     Did patient take their HTN pills today/last night as usual?  Yes - has forgotten to take the past couple of days.      Concerns regarding left LE. Onset of worsening swelling and redness/pain 5 days ago.  Has a burning/tingling feeling.      Was seen at [facility] yesterday (Direct care) - was given an antibiotic (thinks Doxycycline - though unsure).      Was kicked out of [employer] 1.5 weeks ago due to curfew violation.  He states he fell asleep at his father's home and missed curfew.  Is able to return in 30 days to [facility].    Is currently living in his truck.      Has been able to continue schooling - is in a building maintenance training program, has another 9 weeks left of program.    No other current employment.     Denies relapse.  States he has maintained his sobriety.      He is requesting MFIP form due to not being able to work due to left LE swelling, pain and cellulitis.   He has goals of working in building maintenance after completion of his schooling.       Problem, Medication and Allergy Lists were   reviewed and are current.     Patient Active Problem List    Diagnosis Date Noted     Cellulitis of left lower extremity 06/01/2017     Priority: Medium     Bilateral edema of lower extremity 06/01/2017     Priority: Medium     Acute pain of right knee 06/01/2017     Priority: Medium     ADD (attention deficit disorder) 04/24/2017     Priority: Medium     Reviewed medical records from [facility] - Dr. Rafael Niño.   Previously on Adderall XR, 30 mg daily.         Nicotine dependence with other nicotine-induced disorder, unspecified nicotine product type 04/06/2017      "Priority: Medium     History of cocaine abuse 04/06/2017     Priority: Medium     currently in treatment at [facility]       Essential hypertension 04/06/2017     Priority: Medium     ROBERTH (obstructive sleep apnea) 04/06/2017     Priority: Medium     Cocaine use disorder, mild, in early remission, in controlled environment 12/17/2015     Priority: Medium     Restless legs syndrome (RLS) 03/04/2011     Priority: Medium     Family history of type 2 diabetes mellitus 02/22/2011     Priority: Medium     Overview:   Father has Type 2 Diabetes.       Hyperlipidemia 02/22/2011     Priority: Medium     Overview:   LDL was 166 in 2/11.  Statin was started (1st time).  LDL was 135 in 2005.       Metabolic syndrome 02/08/2011     Priority: Medium     Obesity 02/08/2011     Priority: Medium     Overview:   Estimated Body mass index is 38.51 kg/(m^2) as calculated from the following:    Height as of this encounter: 5' 11.85\"(1.825 m).    Weight as of this encounter: 282 lb 12.8 oz(128.277 kg).       Hypothyroidism following radioiodine therapy 12/04/2007     Priority: Medium   ,     Current Outpatient Prescriptions   Medication Sig Dispense Refill     omeprazole (PRILOSEC) 20 MG CR capsule Take 1 capsule (20 mg) by mouth daily 30 capsule 3     amphetamine-dextroamphetamine (ADDERALL XR) 30 MG per 24 hr capsule Take 1 capsule (30 mg) by mouth daily 30 capsule 0     furosemide (LASIX) 20 MG tablet Take 1 tablet (20 mg) by mouth 2 times daily 30 tablet 1     Lactobacillus Rhamnosus, GG, (CVS PROBIOTIC, LACTOBACILLUS,) CAPS Take 1 capsule by mouth 2 times daily 21 capsule 0     order for DME Knee brace 1 Box 0     cetirizine (ZYRTEC) 10 MG tablet Take 1 tablet (10 mg) by mouth every evening 30 tablet 6     hydrochlorothiazide (HYDRODIURIL) 25 MG tablet Take 1 tablet (25 mg) by mouth daily 30 tablet 6     atenolol (TENORMIN) 25 MG tablet Take 1 tablet (25 mg) by mouth daily 30 tablet 6     levothyroxine (SYNTHROID/LEVOTHROID) 175 MCG " tablet Take 1 tablet (175 mcg) by mouth daily 30 tablet 6   ,   No Known Allergies  Patient is an established patient of this clinic.         Review of Systems:   Review of Systems   Constitutional: Negative.    Respiratory: Negative.    Cardiovascular: Positive for leg swelling. Negative for chest pain.   Skin: Positive for color change (left LE ).             Physical Exam:     Patient Vitals for the past 24 hrs:   BP Temp Temp src Pulse Resp SpO2 Weight   09/01/17 0849 (!) 147/96 - - - - - -   09/01/17 0805 (!) 153/106 - - - - - -   09/01/17 0801 (!) 142/105 97.5  F (36.4  C) Oral 79 18 96 % 298 lb (135.2 kg)     Body mass index is 42.15 kg/(m^2).  Vital signs normal except BP slightly elevated, took antihypertensive medications right before appt     Physical Exam   Constitutional: He appears well-nourished. No distress.   Cardiovascular: Normal rate and regular rhythm.    Pulmonary/Chest: Effort normal and breath sounds normal. No respiratory distress. He has no wheezes.   Musculoskeletal:        Left lower leg: He exhibits tenderness and swelling. Left lower leg edema: +2 pitting edema up to shin.        Legs:      Assessment and Plan       Phan was seen today for forms and refill request.    Diagnoses and all orders for this visit:    Cellulitis of left lower extremity  Continue antibiotics as prescribed by provider at [facility].   Start wearing compression stockings - will help with swelling and improve circulation, which will help facilitate healing    Essential hypertension  Continue HCTZ, 25 mg and Atenolol, 25 mg - work on adherence to regimen.    Discussed exercise and smoking cessation.     Bilateral edema of lower extremity  -     US KARLOS Doppler with Exercise; Future - scheduled for 9/5/17.   -     Consider lymphedema, vascular surgery referral, or cardiac rehab depending on results.      MFIP form completed and reviewed with patient - plan for 3 months of inability to work - plan return to work after  completion of training.    Follow-up in 2 weeks, sooner as needed.       Options for treatment and follow-up care were reviewed with the patient. Phan Eduardo  engaged in the decision making process and verbalized understanding of the options discussed and agreed with the final plan.    PINKY Acevedo CNP

## 2017-09-12 ENCOUNTER — OFFICE VISIT (OUTPATIENT)
Dept: FAMILY MEDICINE | Facility: CLINIC | Age: 52
End: 2017-09-12

## 2017-09-12 VITALS
SYSTOLIC BLOOD PRESSURE: 136 MMHG | HEART RATE: 74 BPM | RESPIRATION RATE: 20 BRPM | BODY MASS INDEX: 41.79 KG/M2 | WEIGHT: 295.4 LBS | TEMPERATURE: 97.7 F | OXYGEN SATURATION: 96 % | DIASTOLIC BLOOD PRESSURE: 91 MMHG

## 2017-09-12 DIAGNOSIS — I10 ESSENTIAL HYPERTENSION, BENIGN: ICD-10-CM

## 2017-09-12 DIAGNOSIS — L03.116 CELLULITIS OF LEFT LOWER EXTREMITY: Primary | ICD-10-CM

## 2017-09-12 NOTE — MR AVS SNAPSHOT
After Visit Summary   9/12/2017    Phan Eduardo    MRN: [id number]           Patient Information     Date Of Birth          1965        Visit Information        Provider Department      9/12/2017 2:20 PM Ede Alan DO [facility]        Care Instructions    Here is the plan from today's visit    Left lower leg cellulitis  - much improved  - monitor for now  - wear compression stockings and elevate  - appt in 1-2 weeks to make sure still going the right direction    Follow up: 1-2 weeks      Thank you for coming to [facility] today.  Lab Testing:  **If you had lab testing today and your results are reassuring or normal they will be mailed to you or sent through [employer] within 7 days.   **If the lab tests need quick action we will call you with the results.  The phone number we will call with results is # [phone] (home) . If this is not the best number please call our clinic and change the number.  Medication Refills:  If you need any refills please call your pharmacy and they will contact us.   If you need to  your refill at a new pharmacy, please contact the new pharmacy directly. The new pharmacy will help you get your medications transferred faster.   Scheduling:  If you have any concerns about today's visit or wish to schedule another appointment please call our office during normal business hours [phone] (8-5:00 M-F)  If a referral was made to a [facility] and you don't get a call from central scheduling please call [phone].  If a Mammogram was ordered for you at [facility] call [phone] to schedule or change your appointment.  If you had an XRay/CT/Ultrasound/MRI ordered the number is [phone] to schedule or change your radiology appointment.   Medical Concerns:  If you have urgent medical concerns please call [phone] at any time of the day.  If you have a medical emergency please call  [phone].            Follow-ups after your visit        Who to contact     Please call your clinic at [phone] to:    Ask questions about your health    Make or cancel appointments    Discuss your medicines    Learn about your test results    Speak to your doctor   If you have compliments or concerns about an experience at your clinic, or if you wish to file a complaint, please contact [facility] Patient Relations at [phone] or email us at [email or web address]         Additional Information About Your Visit        [employer] Information     [employer] is an electronic gateway that provides easy, online access to your medical records. With [employer], you can request a clinic appointment, read your test results, renew a prescription or communicate with your care team.     To sign up for [employer] visit the website at [email or web address]   You will be asked to enter the access code listed below, as well as some personal information. Please follow the directions to create your username and password.     Your access code is: [email or web address]  Expires: 2017 11:27 AM     Your access code will  in 90 days. If you need help or a new code, please contact your [facility] or call [phone] for assistance.        Care EveryWhere ID     This is your Care EveryWhere ID. This could be used by other organizations to access your [facility] medical records  [id number]        Your Vitals Were     Pulse Temperature Respirations Pulse Oximetry BMI (Body Mass Index)       74 97.7  F (36.5  C) (Oral) 20 96% 41.79 kg/m2        Blood Pressure from Last 3 Encounters:   17 (!) 136/91   17 (!) 147/96   17 108/70    Weight from Last 3 Encounters:   17 295 lb 6.4 oz (134 kg)   17 298 lb (135.2 kg)   17 297 lb 3.2 oz (134.8 kg)              Today, you had the following     No orders found for display       Primary Care Provider  Office Phone # Fax #    Kassy Hidalgo, [facility] [phone]       [address] [facility] [address]        Equal Access to Services     JOSE CESPEDES : Hadii aad ku hadrosa mjaret Rueda, wamanda damiánqadaha, qamylesta kaalmada rohan, oliverio jordanhermann tsekristine doherty sherwin dickey. So [facility] [phone].    ATENCIÓN: Si habla español, tiene a arguello disposición servicios gratuitos de asistencia lingüística. Llame al [phone].    We comply with applicable federal civil rights laws and [place] laws. We do not discriminate on the basis of race, color, national origin, age, disability sex, sexual orientation or gender identity.            Thank you!     Thank you for choosing [facility]  for your care. Our goal is always to provide you with excellent care. Hearing back from our patients is one way we can continue to improve our services. Please take a few minutes to complete the written survey that you may receive in the mail after your visit with us. Thank you!             Your Updated Medication List - Protect others around you: Learn how to safely use, store and throw away your medicines at www.disposemymeds.org.          This list is accurate as of: 9/12/17  2:55 PM.  Always use your most recent med list.                   Brand Name Dispense Instructions for use Diagnosis    amphetamine-dextroamphetamine 30 MG per 24 hr capsule    ADDERALL XR    30 capsule    Take 1 capsule (30 mg) by mouth daily    Attention deficit disorder, unspecified hyperactivity presence       atenolol 25 MG tablet    TENORMIN    30 tablet    Take 1 tablet (25 mg) by mouth daily    Essential hypertension, benign       cetirizine 10 MG tablet    zyrTEC    30 tablet    Take 1 tablet (10 mg) by mouth every evening    Dysfunction of eustachian tube, bilateral       [employer]S PROBIOTIC (LACTOBACILLUS) Caps     21 capsule    Take 1 capsule by mouth 2 times daily    Cellulitis of left lower extremity        hydrochlorothiazide 25 MG tablet    HYDRODIURIL    30 tablet    Take 1 tablet (25 mg) by mouth daily    Essential hypertension, benign       levothyroxine 175 MCG tablet    SYNTHROID/LEVOTHROID    30 tablet    Take 1 tablet (175 mcg) by mouth daily    Hypothyroidism, unspecified type       omeprazole 20 MG CR capsule    priLOSEC    30 capsule    Take 1 capsule (20 mg) by mouth daily    Gastroesophageal reflux disease, esophagitis presence not specified       order for DME     1 Box    Knee brace    Acute pain of right knee

## 2017-09-12 NOTE — PATIENT INSTRUCTIONS
Here is the plan from today's visit    Left lower leg cellulitis  - much improved  - monitor for now  - wear compression stockings and elevate  - appt in 1-2 weeks to make sure still going the right direction    Follow up: 1-2 weeks      Thank you for coming to [facility] today.  Lab Testing:  **If you had lab testing today and your results are reassuring or normal they will be mailed to you or sent through [facility] within 7 days.   **If the lab tests need quick action we will call you with the results.  The phone number we will call with results is # [phone] (home) . If this is not the best number please call our clinic and change the number.  Medication Refills:  If you need any refills please call your pharmacy and they will contact us.   If you need to  your refill at a new pharmacy, please contact the new pharmacy directly. The new pharmacy will help you get your medications transferred faster.   Scheduling:  If you have any concerns about today's visit or wish to schedule another appointment please call our office during normal business hours [phone] (8-5:00 M-F)  If a referral was made to a [facility] and you don't get a call from central scheduling please call [phone].  If a Mammogram was ordered for you at [facility] call [phone] to schedule or change your appointment.  If you had an XRay/CT/Ultrasound/MRI ordered the number is [phone] to schedule or change your radiology appointment.   Medical Concerns:  If you have urgent medical concerns please call [phone] at any time of the day.  If you have a medical emergency please call [phone].

## 2017-09-12 NOTE — PROGRESS NOTES
HPI:       Phan Eduardo is a 51 year old who presents for the following  Patient presents with:  RECHECK: left leg       Left leg cellulitis improving.  Finished his antibiotics 6 days ago (last Wednesday).  Took full course of doxycyline.  Feels better than it was in the beginning.  Unable to elevate his leg and wear compression stockings like he knows he should due to his social situation-- currently living out of his truck.  Can get back into [facility] in 3 weeks.    Took his BP medication today.  Has been taking his BP medications daily.    Problem, Medication and Allergy Lists were reviewed and are current.  Patient is an established patient of this clinic.         Review of Systems:   Review of Systems   No fever.  No chills.  Redness of legs improving.  Still leg swelling.  No drainage from his legs.         Physical Exam:   Patient Vitals for the past 24 hrs:   BP Temp Temp src Pulse Resp SpO2 Weight   09/12/17 1430 (!) 136/91 97.7  F (36.5  C) Oral 74 20 96 % 295 lb 6.4 oz (134 kg)     Body mass index is 41.79 kg/(m^2).  Vitals were reviewed and were normal except elevated BP.     Physical Exam  Constitutional: He appears well-developed and well-nourished. No distress.   Psychiatric: His behavior is normal. Judgment and thought content normal.   Mood depressed.  Affect mood congruent.   Left leg: pitting edema 2+, mild erythema up to mid-shin, no warmth, minimal tenderness.  Visible scarring of left mid leg from prior surgery.      Results:     No labs drawn.    KARLOS normal.    Left lower extremity venous Doppler:    1. No DVT demonstrated in the left lower extremity.  2. Nonocclusive thrombus in the left small saphenous vein.  Assessment and Plan     Left lower leg cellulitis  Patient with history of surgery of left leg requiring isabel placement.  KARLOS performed and was normal.  No DVT in lower extremity although nonocclusive thrombus in left small saphenous vein.  Left lower leg cellulitis much  improved from previous.  Still mild erythema and edema although does not look infected.  Patient living out of his car right now so doesn't have any compression stockings.  Recommend compression stockings (antiembolism stockings given) and elevating legs as much as able.  Will continue to monitor and f/u recommended in 1-2 weeks to make sure still improving.   -  Anti embolism stockings (LOT # [id number])  - consider lymphedema referral if no improvement    Benign Essential HTN  BP elevated today.  Taking 25mg HCTZ and 25mg atenolol daily.  Will recheck next visit and if still elevated consider increasing BP medication.      Follow up: 1-2 weeks      There are no discontinued medications.  Options for treatment and follow-up care were reviewed with the patient. Phan Eduardo  engaged in the decision making process and verbalized understanding of the options discussed and agreed with the final plan.    Ede Alan DO

## 2017-09-12 NOTE — PROGRESS NOTES
Preceptor Attestation:   Patient seen and discussed with the resident. Assessment and plan reviewed with resident and agreed upon.   Supervising Physician:  Latoya Gomez MD  [facility]

## 2017-10-30 ENCOUNTER — TELEPHONE (OUTPATIENT)
Dept: SLEEP MEDICINE | Facility: CLINIC | Age: 52
End: 2017-10-30

## 2017-11-10 NOTE — TELEPHONE ENCOUNTER
Orders have been received from [facility] and have been signed, faxed and scanned into chart.    ANGELIC Barnett

## 2017-11-30 DIAGNOSIS — F98.8 ATTENTION DEFICIT DISORDER, UNSPECIFIED HYPERACTIVITY PRESENCE: ICD-10-CM

## 2017-11-30 DIAGNOSIS — E03.9 HYPOTHYROIDISM, UNSPECIFIED TYPE: ICD-10-CM

## 2017-11-30 DIAGNOSIS — I10 ESSENTIAL HYPERTENSION, BENIGN: ICD-10-CM

## 2017-11-30 RX ORDER — HYDROCHLOROTHIAZIDE 25 MG/1
25 TABLET ORAL DAILY
Qty: 30 TABLET | Refills: 6 | Status: SHIPPED | OUTPATIENT
Start: 2017-11-30

## 2017-11-30 RX ORDER — DEXTROAMPHETAMINE SACCHARATE, AMPHETAMINE ASPARTATE MONOHYDRATE, DEXTROAMPHETAMINE SULFATE AND AMPHETAMINE SULFATE 7.5; 7.5; 7.5; 7.5 MG/1; MG/1; MG/1; MG/1
30 CAPSULE, EXTENDED RELEASE ORAL DAILY
Qty: 30 CAPSULE | Refills: 0 | OUTPATIENT
Start: 2017-11-30

## 2017-11-30 RX ORDER — LEVOTHYROXINE SODIUM 175 UG/1
175 TABLET ORAL DAILY
Qty: 30 TABLET | Refills: 6 | Status: SHIPPED | OUTPATIENT
Start: 2017-11-30

## 2017-11-30 NOTE — TELEPHONE ENCOUNTER
Levothyroxine 175mcg Tablets      Last Written Prescription Date: 04//11/17  Last Fill Quantity: 30,  # refills: 0   Last Office Visit with G, [facility] or [facility] prescribing provider: 09/12/17    Thank You  Ana Sharp CPhT, [place] [facility] Technician  [phone]  [email or web address]

## 2018-01-05 DIAGNOSIS — K21.9 GASTROESOPHAGEAL REFLUX DISEASE, ESOPHAGITIS PRESENCE NOT SPECIFIED: ICD-10-CM

## 2018-01-16 ENCOUNTER — TELEPHONE (OUTPATIENT)
Dept: SLEEP MEDICINE | Facility: CLINIC | Age: 53
End: 2018-01-16

## 2018-01-16 NOTE — TELEPHONE ENCOUNTER
Spoke with the patient and was told that he is now treating with [facility] and we can disregard his orders.

## 2018-01-16 NOTE — TELEPHONE ENCOUNTER
Orders received from [facility] medical but patient never followed up. Patient needs to make an appointment before we are able to sign the forms. Message left at home number.

## 2018-02-06 DIAGNOSIS — F98.8 ATTENTION DEFICIT DISORDER, UNSPECIFIED HYPERACTIVITY PRESENCE: ICD-10-CM

## 2018-02-06 RX ORDER — DEXTROAMPHETAMINE SACCHARATE, AMPHETAMINE ASPARTATE MONOHYDRATE, DEXTROAMPHETAMINE SULFATE AND AMPHETAMINE SULFATE 7.5; 7.5; 7.5; 7.5 MG/1; MG/1; MG/1; MG/1
30 CAPSULE, EXTENDED RELEASE ORAL DAILY
Qty: 30 CAPSULE | Refills: 0 | Status: CANCELLED | OUTPATIENT
Start: 2018-02-06

## 2018-02-06 NOTE — TELEPHONE ENCOUNTER
Refill Request:     Drug:Adderall XR 30mg   Last Fill Date: 01/05/2018  Last Fill Quantity: 30  Last Office Visit: 09/12/2017    Please walk hardcopy to [facility] if refill appropriate.      Thank you,  Arely Almonte, PharmD  [facility]  [phone]

## 2018-08-09 ENCOUNTER — HOSPITAL ENCOUNTER (EMERGENCY)
Facility: CLINIC | Age: 53
Discharge: HOME | End: 2018-08-09
Attending: PSYCHIATRY & NEUROLOGY | Admitting: PSYCHIATRY & NEUROLOGY
Payer: COMMERCIAL

## 2018-08-09 VITALS
WEIGHT: 315 LBS | RESPIRATION RATE: 20 BRPM | TEMPERATURE: 98.7 F | OXYGEN SATURATION: 92 % | HEART RATE: 81 BPM | DIASTOLIC BLOOD PRESSURE: 85 MMHG | BODY MASS INDEX: 45.05 KG/M2 | SYSTOLIC BLOOD PRESSURE: 144 MMHG

## 2018-08-09 DIAGNOSIS — F14.24 COCAINE DEPENDENCE WITH COCAINE-INDUCED MOOD DISORDER (H): ICD-10-CM

## 2018-08-09 LAB
AMPHETAMINES UR QL SCN: NEGATIVE
BARBITURATES UR QL: NEGATIVE
BENZODIAZ UR QL: NEGATIVE
CANNABINOIDS UR QL SCN: NEGATIVE
COCAINE UR QL: POSITIVE
ETHANOL UR QL SCN: NEGATIVE
OPIATES UR QL SCN: NEGATIVE

## 2018-08-09 PROCEDURE — 80320 DRUG SCREEN QUANTALCOHOLS: CPT | Performed by: FAMILY MEDICINE

## 2018-08-09 PROCEDURE — 99284 EMERGENCY DEPT VISIT MOD MDM: CPT | Mod: Z6 | Performed by: PSYCHIATRY & NEUROLOGY

## 2018-08-09 PROCEDURE — 25000132 ZZH RX MED GY IP 250 OP 250 PS 637: Performed by: PSYCHIATRY & NEUROLOGY

## 2018-08-09 PROCEDURE — 90791 PSYCH DIAGNOSTIC EVALUATION: CPT

## 2018-08-09 PROCEDURE — 99285 EMERGENCY DEPT VISIT HI MDM: CPT | Mod: 25

## 2018-08-09 PROCEDURE — 80307 DRUG TEST PRSMV CHEM ANLYZR: CPT | Performed by: FAMILY MEDICINE

## 2018-08-09 RX ORDER — IBUPROFEN 600 MG/1
600 TABLET, FILM COATED ORAL ONCE
Status: COMPLETED | OUTPATIENT
Start: 2018-08-09 | End: 2018-08-09

## 2018-08-09 RX ADMIN — IBUPROFEN 600 MG: 600 TABLET, FILM COATED ORAL at 16:11

## 2018-08-09 ASSESSMENT — ENCOUNTER SYMPTOMS
EYES NEGATIVE: 1
RESPIRATORY NEGATIVE: 1
MUSCULOSKELETAL NEGATIVE: 1
NEUROLOGICAL NEGATIVE: 1
SLEEP DISTURBANCE: 1
ACTIVITY CHANGE: 1
DECREASED CONCENTRATION: 1
HEMATOLOGIC/LYMPHATIC NEGATIVE: 1
GASTROINTESTINAL NEGATIVE: 1
CARDIOVASCULAR NEGATIVE: 1
ENDOCRINE NEGATIVE: 1
HYPERACTIVE: 0
HALLUCINATIONS: 0
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
## 2018-08-09 NOTE — ED AVS SNAPSHOT
[facility], Emergency Department    [address]    [place] [address]    Phone:  [phone]    Fax:  [phone]                                       Phan Eduardo   MRN: [id number]    Department:  [facility], Emergency Department   Date of Visit:  8/9/2018           Patient Information     Date Of Birth          1965        Your diagnoses for this visit were:     Cocaine dependence with cocaine-induced mood disorder (H)        You were seen by Piero Hernandez MD.      Follow-up Information     Follow up with Carol Roman MD.    Specialty:  Internal Medicine    Contact information:    [facility]  [address] [facility] [address] [address] [phone]          Discharge Instructions       Please follow-up with established care and services  Work on sobriety. Get a rule 25 and get into treatment  I am sorry we cannot get you into treatment from the emergency room    24 Hour Appointment Hotline       To make an appointment at any [facility] clinic, call [phone] ([phone]). If you don't have a family doctor or clinic, we will help you find one. [facility] clinics are conveniently located to serve the needs of you and your family.             Review of your medicines      Our records show that you are taking the medicines listed below. If these are incorrect, please call your family doctor or clinic.        Dose / Directions Last dose taken    amphetamine-dextroamphetamine 30 MG per 24 hr capsule   Commonly known as:  ADDERALL XR   Dose:  30 mg   Quantity:  30 capsule        Take 1 capsule (30 mg) by mouth daily   Refills:  0        atenolol 25 MG tablet   Commonly known as:  TENORMIN   Dose:  25 mg   Quantity:  30 tablet        Take 1 tablet (25 mg) by mouth daily   Refills:  6        cetirizine 10 MG tablet   Commonly known as:  zyrTEC   Dose:  10 mg   Quantity:  30 tablet        Take 1 tablet (10 mg) by mouth every evening   Refills:  6        [employer] PROBIOTIC  (LACTOBACILLUS) Caps   Dose:  1 capsule   Quantity:  21 capsule        Take 1 capsule by mouth 2 times daily   Refills:  0        hydrochlorothiazide 25 MG tablet   Commonly known as:  HYDRODIURIL   Dose:  25 mg   Quantity:  30 tablet        Take 1 tablet (25 mg) by mouth daily   Refills:  6        levothyroxine 175 MCG tablet   Commonly known as:  SYNTHROID/LEVOTHROID   Dose:  175 mcg   Quantity:  30 tablet        Take 1 tablet (175 mcg) by mouth daily   Refills:  6        omeprazole 20 MG CR capsule   Commonly known as:  priLOSEC   Dose:  20 mg   Quantity:  30 capsule        Take 1 capsule (20 mg) by mouth daily   Refills:  3        order for DME   Quantity:  1 Box        Knee brace   Refills:  0                Procedures and tests performed during your visit     Drug abuse screen 6 urine (tox)      Orders Needing Specimen Collection     None      Pending Results     No orders found from 8/7/2018 to 8/10/2018.            Pending Culture Results     No orders found from 8/7/2018 to 8/10/2018.            Pending Results Instructions     If you had any lab results that were not finalized at the time of your Discharge, you can call the ED Lab Result RN at [phone]. You will be contacted by this team for any positive Lab results or changes in treatment. The nurses are available 7 days a week from 10A to 6:30P.  You can leave a message 24 hours per day and they will return your call.        Thank you for choosing [facility]       Thank you for choosing [facility] for your care. Our goal is always to provide you with excellent care. Hearing back from our patients is one way we can continue to improve our services. Please take a few minutes to complete the written survey that you may receive in the mail after you visit with us. Thank you!        [employer] Information     [employer] lets you send messages to your doctor, view your test results, renew your prescriptions, schedule appointments and more. To sign up, go to  [email or web address] . Click on \"Log in\" on the left side of the screen, which will take you to the Welcome page. Then click on \"Sign up Now\" on the right side of the page.     You will be asked to enter the access code listed below, as well as some personal information. Please follow the directions to create your username and password.     Your access code is: [email or web address]  Expires: 2018  4:15 PM     Your access code will  in 90 days. If you need help or a new code, please call your [facility] clinic or [phone].        Care EveryWhere ID     This is your Care EveryWhere ID. This could be used by other organizations to access your [facility] medical records  [id number]        Equal Access to Services     JOSE CESPEDES : Jay Rueda, armand selby, merritt madrigal, oliverio dickey. So [facility] [phone].    ATENCIÓN: Si habla español, tiene a arguello disposición servicios gratuitos de asistencia lingüística. Gage al [phone].    We comply with applicable federal civil rights laws and [place] laws. We do not discriminate on the basis of race, color, national origin, age, disability, sex, sexual orientation, or gender identity.            After Visit Summary       This is your record. Keep this with you and show to your community pharmacist(s) and doctor(s) at your next visit.                  "

## 2018-08-09 NOTE — ED NOTES
I have performed an in person assessment of the patient. Based on this assessment the patient no longer requires a one on one attendant at this point in time.    Piero Hernandez MD  3:22 PM  August 9, 2018           Piero Hernandez MD  08/09/18 1522

## 2018-08-09 NOTE — DISCHARGE INSTRUCTIONS
Please follow-up with established care and services  Work on sobriety. Get a rule 25 and get into treatment  I am sorry we cannot get you into treatment from the emergency room

## 2018-08-09 NOTE — ED NOTES
Patient arrives to [facility]. Psych Associate explains process and gives patient urine cup. Patient told about meeting with Mental Health  and Psychiatrist. Patient told about 2-5 hour time frame for complete evaluation.

## 2018-08-09 NOTE — ED AVS SNAPSHOT
[facility], [place], Emergency Department    [address]    [facility] [address]    Phone:  [phone]    Fax:  [phone]                                       Phan Eduardo   MRN: [id number]    Department:  [facility], Emergency Department   Date of Visit:  8/9/2018           After Visit Summary Signature Page     I have received my discharge instructions, and my questions have been answered. I have discussed any challenges I see with this plan with the nurse or doctor.    ..........................................................................................................................................  Patient/Patient Representative Signature      ..........................................................................................................................................  Patient Representative Print Name and Relationship to Patient    ..................................................               ................................................  Date                                            Time    ..........................................................................................................................................  Reviewed by Signature/Title    ...................................................              ..............................................  Date                                                            Time

## 2018-10-03 ENCOUNTER — TELEPHONE (OUTPATIENT)
Dept: SLEEP MEDICINE | Facility: CLINIC | Age: 53
End: 2018-10-03

## 2018-10-03 NOTE — TELEPHONE ENCOUNTER
Orders rev'd from [facility] for cpap supplies.  Order has been put in Dr. Roy's box for review and signature.    ANGELIC Barnett

## 2018-11-16 ENCOUNTER — TELEPHONE (OUTPATIENT)
Dept: SLEEP MEDICINE | Facility: CLINIC | Age: 53
End: 2018-11-16

## 2018-11-16 NOTE — TELEPHONE ENCOUNTER
After review with Dr. Roy it was discovered that the patient is actually being seen by Dr. Morales at [facility]. Orders were faxed back to [facility] and let them know that this is no longer a [facility] patient.    Brooke Marmolejo [facility] ~[place]

## 2018-11-16 NOTE — TELEPHONE ENCOUNTER
Order received from [facility] for cpap supplies. Placed in providers ib-basket.    Brooke Marmolejo [facility] ~[place]

## 2019-07-24 NOTE — MR AVS SNAPSHOT
After Visit Summary   4/6/2017    Phan Eduardo    MRN: [id number]           Patient Information     Date Of Birth          1965        Visit Information        Provider Department      4/6/2017 1:40 PM Kassy Hidalgo APRN CNP [facility]        Today's Diagnoses     Encounter to establish care    -  1    Preventative health care        ROBERTH (obstructive sleep apnea)        Essential hypertension        Hypothyroidism, unspecified type           Follow-ups after your visit        Additional Services     GASTROENTEROLOGY ADULT REF PROCEDURE ONLY       Last Lab Result: No results found for: CR  Body mass index is 43.82 kg/(m^2).     Needed:  No  Language:  English    Patient will be contacted to schedule procedure.     Please be aware that coverage of these services is subject to the terms and limitations of your health insurance plan.  Call member services at your health plan with any benefit or coverage questions.  Any procedures must be performed at a [facility] facility OR coordinated by your clinic's referral office.    Please bring the following with you to your appointment:    (1) Any X-Rays, CTs or MRIs which have been performed.  Contact the facility where they were done to arrange for  prior to your scheduled appointment.    (2) List of current medications   (3) This referral request   (4) Any documents/labs given to you for this referral            SLEEP EVALUATION & MANAGEMENT REFERRAL - ADULT       Please be aware that coverage of these services is subject to the terms and limitations of your health insurance plan.  Call member services at your health plan with any benefit or coverage questions.      Please bring the following to your appointment:    >>   List of current medications   >>   This referral request   >>   Any documents/labs given to you for this referral    [facility]  Ph [phone] (Age 15 and up)       Patient currently receiving [facility] of RN/PT for CHF, LVAD assessments and teaching. Patient was homebound due to weakness, need for assistance to leave home safely. Home care goals have not been met. If appropriate at discharge, please place [facility] order and select RN/PT, add additional services if needed. Liaison will continue to follow until discharge.     Scarlet Lopez RN   Home Care Services Liaison  [facility]  [phone]       "            Future tests that were ordered for you today     Open Future Orders        Priority Expected Expires Ordered    SLEEP EVALUATION & MANAGEMENT REFERRAL - ADULT Routine  2018            Who to contact     Please call your clinic at [phone] to:    Ask questions about your health    Make or cancel appointments    Discuss your medicines    Learn about your test results    Speak to your doctor   If you have compliments or concerns about an experience at your clinic, or if you wish to file a complaint, please contact [facility] Patient Relations at [phone] or email us at [email or web address]         Additional Information About Your Visit        [employer] Information     [employer] is an electronic gateway that provides easy, online access to your medical records. With [employer], you can request a clinic appointment, read your test results, renew a prescription or communicate with your care team.     To sign up for [employer] visit the website at [email or web address]   You will be asked to enter the access code listed below, as well as some personal information. Please follow the directions to create your username and password.     Your access code is: [email or web address]  Expires: 2017  2:12 PM     Your access code will  in 90 days. If you need help or a new code, please contact your [facility] Clinic or call [phone] for assistance.        Care EveryWhere ID     This is your Care EveryWhere ID. This could be used by other organizations to access your [facility] medical records  [id number]        Your Vitals Were     Pulse Temperature Respirations Height Pulse Oximetry BMI (Body Mass Index)    84 98  F (36.7  C) (Oral) 20 5' 10\" (177.8 cm) 96% 43.82 kg/m2       Blood Pressure from Last 3 Encounters:   17 114/74    Weight from Last 3 Encounters:   17 (!) 305 lb 6.4 oz (138.5 kg)              We Performed the " Following     CBC with Plt ([facility])     Comprehensive Metabolic Panel ([facility])     GASTROENTEROLOGY ADULT REF PROCEDURE ONLY     Hemoglobin A1c ([facility])     TSH        Primary Care Provider    Provider Unknown       No address on file        Thank you!     Thank you for choosing [facility]  for your care. Our goal is always to provide you with excellent care. Hearing back from our patients is one way we can continue to improve our services. Please take a few minutes to complete the written survey that you may receive in the mail after your visit with us. Thank you!             Your Updated Medication List - Protect others around you: Learn how to safely use, store and throw away your medicines at www.disposemymeds.org.          This list is accurate as of: 4/6/17  2:12 PM.  Always use your most recent med list.                   Brand Name Dispense Instructions for use    ADDERALL PO      Take 30 mg by mouth       ATENOLOL PO      Take 20 mg by mouth       LEVOTHYROXINE SODIUM PO      Take 175 mcg by mouth       LISINOPRIL PO      Take 20 mg by mouth       OMEPRAZOLE PO

## 2019-08-23 ENCOUNTER — TRANSFERRED RECORDS (OUTPATIENT)
Dept: HEALTH INFORMATION MANAGEMENT | Facility: CLINIC | Age: 54
End: 2019-08-23

## 2023-05-19 NOTE — PROCEDURES
"SLEEP STUDY INTERPRETATION  SPLIT-NIGHT STUDY      Patient: Phan Eduardo  Date of Birth: [birth year]  Study Date: 5/10/17  MRN: [id number]  Referring Provider: Kassy Hidalgo MD  Ordering Provider: Narcisa Moe MD    Indications for Polysomnography: The patient is a 51 y old Male who is 5' 10\" and weighs 303.0 lbs.  His BMI is 42.9, Eden sleepiness scale is 23.0 and neck size is 47cm.  Relevant medical history includes hypertension, attention deficit disordered, ROBERTH diagnosed 2005. A diagnostic polysomnogram was performed to evaluate for Sleep Apnea and hypoventilation\hypoxemia.  After 132.0 minutes of sleep time the patient exhibited sufficient respiratory events qualifying him for a CPAP trial which was then initiated.      Polysomnogram Data:  A full night polysomnogram recorded the standard physiologic parameters including EEG, EOG, EMG, ECG, nasal and oral airflow.  Respiratory parameters of chest and abdominal movements were recorded with respiratory inductance plethysmography.  Oxygen saturation was recorded by pulse oximetry.      Diagnostic PSG  Sleep Architecture: Marked sleep fragmentation with increased arousal index, lack of REM and N3 sleep on baseline.  The total recording time of the diagnostic portion of the study was 176.2 minutes.  The total sleep time was 132.0 minutes.  During the diagnostic portion of the study the sleep latency was short at 4.2 minutes without the use of a sleep aid.  REM latency was not achieved on the diagnostic.  Arousal index was increased at 98.2 arousals per hour.  Sleep efficiency was decreased at 74.9%.  Wake after sleep onset was 37.0 minutes.   The patient spent 8.7% of total sleep time in Stage N1, 91.3% in Stage N2, 0.0% in Stage N3 and 0.0% in REM.       Respiration: Very severe obstructive sleep apnea with associated hypoxemia    Events - During the diagnostic portion of the study, the polysomnogram revealed a presence of 54 " Another appointment opened 5/26/23 at the [place] location. Called patient to offer the above appointment.    Patient is now scheduled 5/26/23 at 2:20 PM.   obstructive, 0 central, and 0 mixed apneas resulting in an apnea index of 24.5 events per hour.  There were 160 hypopneas resulting in a hypopnea index of 72.7 events per hour.  The combined apnea/hypopnea index was 97.3 events per hour.  The supine AHI was 93.5 events per hour.  The RERA index was 0 events per hour.  The RDI was 97.3 events per hour.     Snoring - was reported as moderate and intermittent.    Respiratory rate and pattern - was notable for normal respiratory rate and pattern.    Sustained Sleep Associated Hypoventilation - Transcutaneous carbon dioxide monitoring was used, however significant hypoventilation was not present with a maximum change 41 to 48 mmHg.    Baseline ABG: pH7.45 PCO2 36 PO2 57    Sleep Associated Hypoxemia - (Greater than 5 minutes O2 sat below 89%) was present.  Baseline oxygen saturation was 87.9%. Lowest oxygen saturation was 68.0%.  Time spent less than or equal to 88% was 99.7 minutes.  Time spent less than or equal to 89% was 105.2 minutes.  97.3 - 97.3             Treatment PSG  Sleep Architecture: Improved sleep consolidation and REM sleep seen on PAP therapy  At 01:13:33 AM the patient was placed on CPAP treatment and was titrated at pressures ranging from 5 cmH2O up to 13/8/0 cmH2O.  The total recording time of the treatment portion of the study was 273.3 minutes.  The total sleep time was 197.5 minutes.  During the treatment portion of the study the sleep latency was 19.5 minutes.  REM latency was 7.5 minutes.  Arousal index was improved at 25.5 arousals per hour.  Sleep efficiency was decreased at 72.3%.  Wake after sleep onset was 53.0 minutes.  The patient spent 5.8% of total sleep time in Stage N1, 34.4% in Stage N2, 8.4% in Stage N3 and 51.4% in REM.       Respiration: CPAP not tolerated by patient; Final Bi-level pressures of IPAP  13 EPAP 8cmH20  resolved sleep disordered breathing including during REM lateral sleep.  The final pressure was Bi-level 13/8/0  cmH2O with an AHI of 0 events per hour including REM lateral.  Time in REM supine on final pressure was 0 minutes. This titration was considered adequate (residual AHI with 75% decrease, or above constraints without REM-supine sleep at final pressure).    Movement Activity: Periodic limb movements developed on PAP, most of which were not associated with arousals.    Periodic Limb Movements  o During the diagnostic portion of the study, there were 0 PLMs recorded.   o During the treatment portion of the study, there were 63 PLMs recorded. The PLM index was 19.1 movements per hour.  The PLM Arousal Index was 0.3 per hour.    REM EMG Activity - Excessive transient / sustained muscle activity was not present.    Nocturnal Behavior - Sleep talking/mumbling noted on baseline    Bruxism - None apparent.    Cardiac Summary: Normal sinus rhythm and rates.  During the diagnostic portion of the study, the average pulse rate was 74.7 bpm.  The minimum pulse rate was 60.0 bpm while the maximum pulse rate was 99.3 bpm.    During the treatment portion of the study, the average pulse rate was 73.7 bpm.  The minimum pulse rate was 59.0 bpm while the maximum pulse rate was 97.0 bpm.     Arrhythmias were not noted.      Assessment:     Very severe obstructive sleep apnea with AHI 97.3 events per hour and with associated hypoxemia.    Marked sleep fragmentation with increased arousal index, lack of REM and N3 sleep on baseline.    CPAP not tolerated by patient; Final Bi-level pressures of 138 resolved sleep disordered breathing including during REM lateral sleep.    Improved sleep consolidation and REM sleep seen on PAP therapy    Periodic limb movements developed on PAP, most of which were not associated with arousals.    Normal sinus rhythm and rates.    Recommendations:    Treatment of ROBERTH with Bi-level 13/8 cmH2O with restriction of sleep to lateral sleep position.  Recommend clinical follow up with sleep management team, for  coaching and review of effectiveness and measures..    Advise regarding the risks of drowsy driving.    Suggest optimizing sleep schedule and avoiding sleep deprivation.    Weight management ( BMI > 30).    Pharmacologic therapy should be used for management of restless legs syndrome only if present and clinically indicated and not based on the presence of periodic limb movements alone.    Diagnostic Codes:  Obstructive Sleep Apnea G47.33  Sleep Hypoxemia G47.36                _____________________________________   Itzel Roy MD 5/12/2017             Table of Oximetry Distribution    Range(%) Time in range (min) Time in range (%) Time in or below range (min) Time in or below range (%)   0.0 - 88.0 99.7 22.3% 99.7 22.3%   0.0 - 89.0 105.2 23.6% 105.2 23.6%

## 2023-07-14 NOTE — PATIENT INSTRUCTIONS
Here is the plan from today's visit    1. Screening for lipoid disorders  - Lipid panel reflex to direct LDL    2. Need for hepatitis B screening test  - Hepatitis B Surface Antibody  - Hepatitis B surface antigen    3. Need for hepatitis C screening test  - Hepatitis C antibody    4. Left leg swelling  Testing of arterial and venous sufficiency in L leg and then follow up with Kassy Hidalgo  - US Lower Extremity Venous Duplex Left; Future  - XR KARLOS Exercise w & wo; Future      Please call or return to clinic if your symptoms don't go away.    Follow up plan - follow up appointment after studies are done    Thank you for coming to [facility] today.  Lab Testing:  **If you had lab testing today and your results are reassuring or normal they will be mailed to you or sent through [employer] within 7 days.   **If the lab tests need quick action we will call you with the results.  The phone number we will call with results is # [phone] (home) . If this is not the best number please call our clinic and change the number.  Medication Refills:  If you need any refills please call your pharmacy and they will contact us.   If you need to  your refill at a new pharmacy, please contact the new pharmacy directly. The new pharmacy will help you get your medications transferred faster.   Scheduling:  If you have any concerns about today's visit or wish to schedule another appointment please call our office during normal business hours [phone] (8-5:00 M-F)  If a referral was made to a [facility] and you don't get a call from central scheduling please call [phone].  If a Mammogram was ordered for you at [facility] call [phone] to schedule or change your appointment.  If you had an XRay/CT/Ultrasound/MRI ordered the number is [phone] to schedule or change your radiology appointment.   Medical Concerns:  If you have urgent medical concerns please call [phone] at any  time of the day.  If you have a medical emergency please call 911.     Xolair Counseling:  Patient informed of potential adverse effects including but not limited to fever, muscle aches, rash and allergic reactions.  The patient verbalized understanding of the proper use and possible adverse effects of Xolair.  All of the patient's questions and concerns were addressed.

## 2023-11-15 NOTE — MR AVS SNAPSHOT
After Visit Summary   5/2/2017    Phan Eduardo    MRN: [id number]           Patient Information     Date Of Birth          1965        Visit Information        Provider Department      5/2/2017 2:00 PM Narcisa Moe MD [facility], [place], Sleep Study        Today's Diagnoses     ROBERTH (obstructive sleep apnea)    -  1      Care Instructions      Your BMI is Body mass index is 42.86 kg/(m^2).  Weight management is a personal decision.  If you are interested in exploring weight loss strategies, the following discussion covers the approaches that may be successful. Body mass index (BMI) is one way to tell whether you are at a healthy weight, overweight, or obese. It measures your weight in relation to your height.  A BMI of 18.5 to 24.9 is in the healthy range. A person with a BMI of 25 to 29.9 is considered overweight, and someone with a BMI of 30 or greater is considered obese. More than two-thirds of American adults are considered overweight or obese.  Being overweight or obese increases the risk for further weight gain. Excess weight may lead to heart disease and diabetes.  Creating and following plans for healthy eating and physical activity may help you improve your health.  Weight control is part of healthy lifestyle and includes exercise, emotional health, and healthy eating habits. Careful eating habits lifelong are the mainstay of weight control. Though there are significant health benefits from weight loss, long-term weight loss with diet alone may be very difficult to achieve- studies show long-term success with dietary management in less than 10% of people. Attaining a healthy weight may be especially difficult to achieve in those with severe obesity. In some cases, medications, devices and surgical management might be considered.  What can you do?  If you are overweight or obese and are interested in methods for weight loss, you should discuss this with your  Date: 11/15/2023         Dx: Other intervertebral disc displacement, lumbar region (M51.26)  Radiculopathy, lumbosacral region (M54.17)  Abnormal posture (R29.3)  Myalgia, other site (M79.18)           Authorized # of Visits:  8       Referring MD:  Thor William MD visit: none scheduled    Medication Changes since last visit?: No    Subjective: [place] report that she is feeling more ache/pain/tightness today. She has been doing her HEP (lumbar extension with self OP in standing in pronelying) regularly but it has not been helping like it did last session. She did report that she went rolling skating with her 3 kids and had to bend down repeatedly which she think started her back symptoms. She is also feeling her (L) neck hurting more than the back at his time. Objective: (+) (R) wry neck; (L) sided neck, shoulder, and posterior shoulder pian 8/10. CROM: (L) Rotation and SB: minimal loss; Inc, NW,  Extension: moderate loss; Inc, NW.      ROM: (Pre-test symptom: tight across lower back (L>R), no (L) LE symptoms)  Lumbar        Movement Loss Pain (+/-)   Flexion Minimal loss P, NW back   Extension Minimal loss P, NW mid back ache   R Sideglide Nil loss P, NW mid back ache   L Sideglide Minimal loss NE stretch opposite side     Date: 11/8/2023  Tx#: 2/8 Date: 11/15/2023  Tx#: 3/8 Date: Tx#: 4/ Date: Tx#: 5/ Date: Tx#: 6/ Date: Tx#: 7/ Date:    Tx#: 8/   Scifit UE only L5 fwd/bkwd x 5 mins ea; NE     Pronelying x 1 min; NE (relaxed back)     REIL x 8 reps; NE     + self OP (sag) x 2 reps; NE    REIL with belt OP at pelvis x 10 reps; NE    Sustained lumbar extension with belt OP at pelvis x 3 + 3 mins (higher angle); NE pressure mid back    REIL with sheet OP x 10 reps; NE    BERT over rail x 10 reps; NE    BERT hands on wall x 10 reps; NE    Sitting posture correction with lumbar roll Scifit UE only L4 fwd/bkwd x 5 mins ea; NE     Sitting posture correction with lumbar roll    C/s retraction 2 x 10 reps; P, "provider.     Consider reducing daily calorie intake by 500 calories.     Keep a food journal.     Avoiding skipping meals, consider cutting portions instead.    Diet combined with exercise helps maintain muscle while optimizing fat loss. Strength training is particularly important for building and maintaining muscle mass. Exercise helps reduce stress, increase energy, and improves fitness. Increasing exercise without diet control, however, may not burn enough calories to loose weight.       Start walking three days a week 10-20 minutes at a time    Work towards walking thirty minutes five days a week     Eventually, increase the speed of your walking for 1-2 minutes at time    In addition, we recommend that you review healthy lifestyles and methods for weight loss available through the National Institutes of Health patient information sites:  http://win.niddk.nih.gov/publications/index.htm    And look into health and wellness programs that may be available through your health insurance provider, employer, local community center, or gilberto club.    Weight management plan: Patient was referred to their PCP to discuss a diet and exercise plan.    MY TREATMENT INFORMATION FOR SLEEP APNEA-  Phan Eduardo    DOCTOR : Narcisa Briscoe [facility]  SLEEP CENTER :      MY CONTACT NUMBER:     Am I having a sleep study at a sleep center?  Make sure you have an appointment for the study before you leave!    Am I having a home sleep study?  Watch this video:  https://www.youtube.com/watch?v=CteI_GhyP9g&[email or web address]    Frequently asked questions:  1. What is Obstructive Sleep Apnea (ROBERTH)? ROBERTH is the most common type of sleep apnea. Apnea means, \"without breath.\"  Apnea is most often caused by narrowing or collapse of the upper airway as muscles relax during sleep.   Almost everyone has occasional apneas. Most people with sleep apnea have had brief interruptions at night frequently for many " NW (better) (L) neck     + self OP x 10 reps; P, NW (better) (L) shoulder    - increased c/s (L) rotation and SB to almost nil loss with minimal to no ache and extension to minimal loss and no ache    BERT hands on wall x 10 reps; Dec, A lower back ache    Seated: t/s extension x 10 reps; P, NW mid lower back    BERT over rail x 10 reps; NE (feels good)     - no ache lower back     Seated: c/s retraction with self OP x 10 reps; NE (minimal cueing to correct technique)          Assessment/HEP:   Vijaya Zuñiga continue to respond to lumbar extension directional preference with a reduction, abolishment of symptoms and and increase in lumbar AROM in all limited motions to nil loss. She was taught c/s retraction with self OP to relieve neck pain/ache that is limiting her ability to do her back exercises. She understand and agree with her treatment plan. All questions and concerns were answered and addressed at this time. Goals:   (8 visits)  1. Patient to consistently perform her HEP and it's progression to maintain her improved condition. 2. Patient to have reduced, centralized, and abolished symptoms in the low back to enable easier ADLs, functional, work, and recreational activities. 3. Patient to have WNL of lumbar mobility in all directions to be able to bend down, rise up from sitting, lay down on her back, wake up in the morning, do yardwork (weeding), carry her 1 y/o, sit on the couch, lean forward from the trunk, and dishwash without producing or increasing symptoms in the (L) lower back , buttock, posterior thigh upto the knee. 4. Patient to consistently have good posture to promote her symptomfree condition. Plan:   Re-assess next session and continue with directional preference exercise, posture correction, patient education, and HEP progression. Charges:  TherEx x 3       Total Timed Treatment: 48 mins Total Treatment Time: 49 mins years.  The severity of sleep apnea is related to how frequent and severe the events are.   2. What are the consequences of ROBERTH? Symptoms include: feeling sleepy during the day, snoring loudly, gasping or stopping of breathing, trouble sleeping, and occasionally morning headaches or heartburn at night.  Sleepiness can be serious and even increase the risk of falling asleep while driving. Other health consequences may include development of high blood pressure and other cardiovascular disease in persons who are susceptible. Untreated ROBERTH  can contribute to heart disease, stroke and diabetes.   3. What are the treatment options? In most situations, sleep apnea is a lifelong disease that must be managed with daily therapy. Medications are not effective for sleep apnea and surgery is generally not considered until other therapies have been tried. Your treatment is your choice . Continuous Positive Airway (CPAP) works right away and is the therapy that is effective in nearly everyone. An oral device to hold your jaw forward is usually the next most reliable option. Other options include postioning devices (to keep you off your back), weight loss, and surgery including a tongue pacing device. There is more detail about some of these options below.    Important tips for using CPAP and similar devices   Know your equipment:  CPAP is continuous positive airway pressure that prevents obstructive sleep apnea by keeping the throat from collapsing while you are sleeping. In most cases, the device is  smart  and can slowly self-adjusts if your throat collapses and keeps a record every day of how well you are treated-this information is available to you and your care team.  BPAP is bilevel positive airway pressure that keeps your throat open and also assists each breath with a pressure boost to maintain adequate breathing.  Special kinds of BPAP are used in patients who have inadequate breathing from lung or heart disease. In most  cases, the device is  smart  and can slowly self-adjusts to assist breathing. Like CPAP, the device keeps a record of how well you are treated.  Your mask is your connection to the device. You get to choose what feels most comfortable and the staff will help to make sure if fits. Here: are some examples of the different masks that are available:       Key points to remember on your journey with sleep apnea:  1. Sleep study.  PAP devices often need to be adjusted during a sleep study to show that they are effective and adjusted right.  2. Good tips to remember: Try wearing just the mask during a quiet time during the day so your body adapts to wearing it. A humidifier is recommended for comfort in most cases to prevent drying of your nose and throat. Allergy medication from your provider may help you if you are having nasal congestion.  3. Getting settled-in. It takes more than one night for most of us to get used to wearing a mask. Try wearing just the mask during a quiet time during the day so your body adapts to wearing it. A humidifier is recommended for comfort in most cases. Our team will work with you carefully on the first day and will be in contact within 4 days and again at 2 and 4 weeks for advice and remote device adjustments. Your therapy is evaluated by the device each day.   4. Use it every night. The more you are able to sleep naturally for 7-8 hours, the more likely you will have good sleep and to prevent health risks or symptoms from sleep apnea. Even if you use it 4 hours it helps. Occasionally all of us are unable to use a medical therapy, in sleep apnea, it is not dangerous to miss one night.   5. Communicate. Call our skilled team on the number provided on the first day if your visit for problems that make it difficult to wear the device. Over 2 out of 3 patients can learn to wear the device long-term with help from our team. Remember to call our team or your sleep providers if you are unable to  wear the device as we may have other solutions for those who cannot adapt to mask CPAP therapy. It is recommended that you sleep your sleep provider within the first 3 months and yearly after that if you are not having problems.   Take care of your equipment. Make sure you clean your mask and tubing using directions every day and that your filter and mask are replaced as recommended or if they are not working.     BESIDES CPAP, WHAT OTHER THERAPIES ARE THERE?    Positioning Device  Positioning devices are generally used when sleep apnea is mild and only occurs on your back.This example shows a pillow that straps around the waist. It may be appropriate for those whose sleep study shows milder sleep apnea that occurs primarily when lying flat on one's back. Preliminary studies have shown benefit but effectiveness at home may need to be verified by a home sleep test. These devices are generally not covered by medical insurance.    Oral Appliance  What is oral appliance therapy?  An oral appliance is a small acrylic device that fits over the upper and lower teeth  (similar to a retainer or a mouth guard). This device slightly moves jaw forward, which moves the base of the tongue forward, opens the airway, improves breathing for effective treat snoring and obstructive sleep apnea in perhaps 7 out of 10 people .  The best working devices are custom-made by a dental device  after a mold is made of the teeth 1, 2, 3.  FOR MORE DETAILED INFORMATION SEE BELOW:  When is an oral appliance indicated?  Oral appliance therapy is recommended as a first-line treatment for patients with primary snoring, mild sleep apnea, and for patients with moderate sleep apnea who prefer appliance therapy to use of CPAP4, 5. Severity of sleep apnea is determined by sleep testing and is based on the number of respiratory events per hour of sleep.   How successful is oral appliance therapy?  The success rate of oral appliance therapy in  patients with mild sleep apnea is 75-80% while in patients with moderate sleep apnea it is 50-70%. The chance of success in patients with severe sleep apnea is 40-50%. The research also shows that oral appliances have a beneficial effect on the cardiovascular health of ROBERTH patients at the same magnitude as CPAP therapy7.  Oral appliances should be a second-line treatment in cases of severe sleep apnea, but if not completely successful then a combination therapy utilizing CPAP plus oral appliance therapy may be effective. Oral appliances tend to be effective in a broad range of patients although studies show that the patients who have the highest success are females, younger patients, those with milder disease, and less severe obesity. 3, 6.   The chances of success are lower in patients who have more severe ROBERTH, are older, and those who are morbidly obese.    Finding a dentist that practices dental sleep medicine  Specific training is available through the American Academy of Dental Sleep Medicine for dentists interested in working in the field of sleep. To find a dentist who is educated in the field of sleep and the use of oral appliances, near you, visit the Web site of the American Academy of Dental Sleep Medicine; also see   http://www.accpstorage.org/newOrganization/patients/oralAppliances.pdf  To search for a dentist certified in these practices:  Http://aadsm.org/FindADentist.aspx?1  1. Anjel et al. Objectively measured vs self-reported compliance during oral appliance therapy for sleep-disordered breathing. Chest 2013; 144(5): 5365-8081.  2. Liza, et al. Objective measurement of compliance during oral appliance therapy for sleep-disordered breathing. Thorax 2013; 68(1): 91-96.  3. Mario et al. Mandibular advancement devices in 620 men and women with ROBERTH and snoring: tolerability and predictors of treatment success. Chest 2004; 125: 3362-0160.  4. Yaw et al. Oral appliances for snoring  and ROBERTH: a review. Sleep 2006; 29: 244-262.  5. Cathy et al. Oral appliance treatment for ROBERTH: an update. J Clin Sleep Med 2014; 10(2): 215-227.  6. shaila Fritz al. Predictors of OSAH treatment outcome. J Dent Res 2007; 86: 2163-1049.      Weight Loss:    Weight management is a personal decision.  If you are interested in exploring weight loss strategies, the following discussion covers the impact on weight loss on sleep apnea and the approaches that may be successful.    Weight loss decreases severity of sleep apnea in most people with obesity. For those with mild obesity who have developed snoring with weight gain, even 15-30 pound weight loss can improve and occasionally eliminate sleep apnea.  Structured and life-long dietary and health habits are necessary to lose weight and keep healthier weight levels.     Though there may be significant health benefits from weight loss, long-term weight loss is very difficult to achieve- studies show success with dietary management in less than 10% of people. In addition, substantial weight loss may require years of dietary control and may be difficult if patients have severe obesity. In these cases, surgical management may be considered.  Finally, older individuals who have tolerated obesity without health complications may be less likely to benefit from weight loss strategies.    Your BMI is Body mass index is 42.86 kg/(m^2).  Body mass index (BMI) is one way to tell whether you are at a healthy weight, overweight, or obese. It measures your weight in relation to your height.  A BMI of 18.5 to 24.9 is in the healthy range. A person with a BMI of 25 to 29.9 is considered overweight, and someone with a BMI of 30 or greater is considered obese. More than two-thirds of American adults are considered overweight or obese.  Being overweight or obese increases the risk for further weight gain. Excess weight may lead to heart disease and diabetes.  Creating and following  plans for healthy eating and physical activity may help you improve your health.  Weight control is part of healthy lifestyle and includes exercise, emotional health, and healthy eating habits. Careful eating habits lifelong are the mainstay of weight control. Though there are significant health benefits from weight loss, long-term weight loss with diet alone may be very difficult to achieve- studies show long-term success with dietary management in less than 10% of people. Attaining a healthy weight may be especially difficult to achieve in those with severe obesity. In some cases, medications, devices and surgical management might be considered.  What can you do?  If you are overweight or obese and are interested in methods for weight loss, you should discuss this with your provider.     Consider reducing daily calorie intake by 500 calories.     Keep a food journal.     Avoiding skipping meals, consider cutting portions instead.    Diet combined with exercise helps maintain muscle while optimizing fat loss. Strength training is particularly important for building and maintaining muscle mass. Exercise helps reduce stress, increase energy, and improves fitness. Increasing exercise without diet control, however, may not burn enough calories to loose weight.       Start walking three days a week 10-20 minutes at a time    Work towards walking thirty minutes five days a week     Eventually, increase the speed of your walking for 1-2 minutes at time    In addition, we recommend that you review healthy lifestyles and methods for weight loss available through the National Institutes of Health patient information sites:  http://win.niddk.nih.gov/publications/index.htm    And look into health and wellness programs that may be available through your health insurance provider, employer, local community center, or gilberto club.        Surgery:    Upper Airway Surgery for ROBERTH  Surgery for ROBERTH is a second-line treatment option in  the management of sleep apnea.  Surgery should be considered for patients who are having a difficult time tolerating CPAP.    Surgery for ROBERTH is directed at areas that are responsible for narrowing or complete obstruction of the airway during sleep.  There are a wide range of procedures available to enlarge and/or stabilize the airway to prevent blockage of breathing in the three major areas where it can occur: the palate, tongue, and nasal regions.  Successful surgical treatment depends on the accurate identification of the factors responsible for obstructive sleep apnea in each person.  A personalized approach is required because there is no single treatment that works well for everyone.  Because of anatomic variation, consultation with an examination by a sleep surgeon is a critical first step in determining what surgical options are best for each patient.  In some cases, examination during sedation may be recommended in order to guide the selection of procedures.  Patients will be counseled about risks and benefits as well as the typical recovery course after surgery. Surgery is typically not a cure for a person s ROBERTH.  However, surgery will often significantly improve one s ROBERTH severity (termed  success rate ).  Even in the absence of a cure, surgery will decrease the cardiovascular risk associated with OSA7; improve overall quality of life8 (sleepiness, functionality, sleep quality, etc).      Palate Procedures:  Patients with ROBERTH often have narrowing of their airway in the region of their tonsils and uvula.  The goals of palate procedures are to widen the airway in this region as well as to help the tissues resist collapse.  Modern palate procedure techniques focus on tissue conservation and soft tissue rearrangement, rather than tissue removal.  Often the uvula is preserved in this procedure. Residual sleep apnea is common in patient after pharyngoplasty with an average reduction in sleep apnea events of  33%2.      Tongue Procedures:  ExamWhile patients are awake, the muscles that surround the throat are active and keep this region open for breathing. These muscles relax during sleep, allowing the tongue and other structures to collapse and block breathing.  There are several different tongue procedures available.  Selection of a tongue base procedure depends on characteristics seen on physical exam.  Generally, procedures are aimed at removing bulky tissues in this area or preventing the back of the tongue from falling back during sleep.  Success rates for tongue surgery range from 50-62%3.    Hypoglossal Nerve Stimulation:  Hypoglossal nerve stimulation has recently received approval from the [place] Food and Drug Administration for the treatment of obstructive sleep apnea.  This is based on research showing that the system was safe and effective in treating sleep apnea6.  Results showed that the median AHI score decreased 68%, from 29.3 to 9.0. This therapy uses an implant system that senses breathing patterns and delivers mild stimulation to airway muscles, which keeps the airway open during sleep.  The system consists of three fully implanted components: a small generator (similar in size to a pacemaker), a breathing sensor, and a stimulation lead.  Using a small handheld remote, a patient turns the therapy on before bed and off upon awakening.    Candidates for this device must be greater than 22 years of age, have moderate to severe ROBERTH (AHI between 20-65), BMI less than 32, have tried CPAP/oral appliance without success, and have appropriate upper airway anatomy (determined by a sleep endoscopy performed by Dr. Padilla).    Hypoglossal Nerve Stimulation Pathway:    The sleep surgeon s office will work with the patient through the insurance prior-authorization process (including communications and appeals).    Nasal Procedures:  Nasal obstruction can interfere with nasal breathing during the day and night.   Studies have shown that relief of nasal obstruction can improve the ability of some patients to tolerate positive airway pressure therapy for obstructive sleep apnea1.  Treatment options include medications such as nasal saline, topical corticosteroid and antihistamine sprays, and oral medications such as antihistamines or decongestants. Non-surgical treatments can include external nasal dilators for selected patients. If these are not successful by themselves, surgery can improve the nasal airway either alone or in combination with these other options.      Combination Procedures:  Combination of surgical procedures and other treatments may be recommended, particularly if patients have more than one area of narrowing or persistent positional disease.  The success rate of combination surgery ranges from 66-80%2,3.      1. Rafi MCKEON. The Role of the Nose in Snoring and Obstructive Sleep Apnoea: An Update.  Eur Arch Otorhinolaryngol. 2011; 268: 1365-73.  2.  Jody SM; Jessica JA; Neeraj JR; Pallanch JF; Callie MB; Bhavani SG; Chino FRENCH. Surgical modifications of the upper airway for obstructive sleep apnea in adults: a systematic review and meta-analysis. SLEEP 2010;33(10):4982-0895. Jaqueline BENOIT. Hypopharyngeal surgery in obstructive sleep apnea: an evidence-based medicine review.  Arch Otolaryngol Head Neck Surg. 2006 Feb;132(2):206-13.  3. Mark YH1, Mack Y, Ollie MANASA. The efficacy of anatomically based multilevel surgery for obstructive sleep apnea. Otolaryngol Head Neck Surg. 2003 Oct;129(4):327-35.  4. Jaqueline BENOIT, Goldberg A. Hypopharyngeal Surgery in Obstructive Sleep Apnea: An Evidence-Based Medicine Review. Arch Otolaryngol Head Neck Surg. 2006 Feb;132(2):206-13.  5. Kathy PJ et al. Upper-Airway Stimulation for Obstructive Sleep Apnea.  N Engl J Med. 2014 Jan 9;370(2):139-49.  6. Maria Ines Y et al. Increased Incidence of Cardiovascular Disease in Middle-aged Men with Obstructive Sleep Apnea. Am J Respir Crit  Care Med; 2002 166: 159-165  7. Ibrahimeric AL et al. Studying Life Effects and Effectiveness of Palatopharyngoplasty (SLEEP) study: Subjective Outcomes of Isolated Uvulopalatopharyngoplasty. Otolaryngol Head Neck Surg. 2011; 144: 623-631.      For his dream enactment behaviors, it was discussed that this could be due to confusional arousal, medications, or RBD. RBD is a neurophysiological substrate for some neurodegenerative disorders like alpha synucleinopathies (Parkinsons disease, Dementia of lewy body and Multisystem Atrophy).  Counseled the patient about association with synucleinopathies and explained that it can also be aggravated by certain drugs like antidepressants.  Recommend he undergo neurological surveillance yearly for development of neurological signs that commensurate with a neurodegenerative disorder. He should also keep his bedroom environment safe - no weapons in the bedroom, padding on the floor, keep sharp objects away from the bed, etc.    Physical safeguards. Sleep and home  environmental safety is important  /keeping the sleep environment safer, including padding the floor near the bed, placing barriers on the side of the bed and moving the bed away from the window, potentially dangerous objects should be removed from the bedroom.    Please do not drive/operate heavy machinery  if drowsy or sleepy.            Follow-ups after your visit        Follow-up notes from your care team     Return in about 2 weeks (around 5/16/2017).      Your next 10 appointments already scheduled     May 10, 2017  8:00 PM CDT   PSG DIAGNOSTIC W/TCM with SLEEP STUDY  5   [facility], [place], Sleep Study ([facility])    [address] [place] [id number] [phone]            May 15, 2017  1:20 PM CDT   Return Visit with PINKY Acevedo CNP   [facility] ([facility])    [address],  [address]  [facility] [address]    [phone]            May 18, 2017   Procedure with Amy Gilbert MD   [facility] [place], Endoscopy ([facility])    [address] [facility] [id number] [phone]           The [facility] is located on the corner of [facility] and [facility] on the [facility]. It is easily accessible from virtually any point in the [place] [place] area, via [address] and [address].            May 30, 2017  9:30 AM CDT   Return Sleep Patient with Narcisa Moe MD   [facility], Sleep Study ([facility])    [address] [place] [id number] [phone]              Future tests that were ordered for you today     Open Future Orders        Priority Expected Expires Ordered    ABG-Blood Gas Arterial ([place] / [place] / [place] / [facility] [place]) Routine  7/1/2017 5/2/2017    Comprehensive Sleep Study Routine  10/29/2017 5/2/2017            Who to contact     If you have questions or need follow up information about today's clinic visit or your schedule please [facility] directly at [phone].  Normal or non-critical lab and imaging results will be communicated to you by Hands-On Mobilehart, letter or phone within 4 business days after the clinic has received the results. If you do not hear from us within 7 days, please contact the clinic through [employer] Mobile[employer] or phone. If you have a critical or abnormal lab result, we will notify you by phone as soon as possible.  Submit refill requests through ScanNano or call your pharmacy and they will forward the refill request to us. Please allow 3 business days for your refill to be completed.          Additional Information About Your Visit        ScanNano Information     ScanNano lets you send messages to your doctor, view your test results, renew your prescriptions, schedule appointments and more. To sign up,  "go to [email or web address] . Click on \"Log in\" on the left side of the screen, which will take you to the Welcome page. Then click on \"Sign up Now\" on the right side of the page.     You will be asked to enter the access code listed below, as well as some personal information. Please follow the directions to create your username and password.     Your access code is: [email or web address]  Expires: 2017  2:12 PM     Your access code will  in 90 days. If you need help or a new code, please call your [facility] clinic or [phone].        Care EveryWhere ID     This is your Care EveryWhere ID. This could be used by other organizations to access your [facility] medical records  [id number]        Your Vitals Were     Pulse Respirations Height Pulse Oximetry BMI (Body Mass Index)       112 18 1.791 m (5' 10.5\") 92% 42.86 kg/m2        Blood Pressure from Last 3 Encounters:   17 (!) 128/94   17 114/77   17 114/74    Weight from Last 3 Encounters:   17 (!) 137.4 kg (303 lb)   17 135.9 kg (299 lb 9.6 oz)   17 (!) 138.5 kg (305 lb 6.4 oz)              We Performed the Following     SLEEP EVALUATION & MANAGEMENT REFERRAL - ADULT        Primary Care Provider Office Phone # Fax #    Kassy PINKY [facility] [phone]       [facility] [address] [facility] [address]        Thank you!     Thank you for choosing [facility], SLEEP STUDY  for your care. Our goal is always to provide you with excellent care. Hearing back from our patients is one way we can continue to improve our services. Please take a few minutes to complete the written survey that you may receive in the mail after your visit with us. Thank you!             Your Updated Medication List - Protect others around you: Learn how to safely use, store and throw away your medicines at www.disposemymeds.org.          This list is accurate as of: 17  2:52 PM.  Always use your most recent med list. "                   Brand Name Dispense Instructions for use    albuterol 108 (90 BASE) MCG/ACT Inhaler    PROAIR HFA/PROVENTIL HFA/VENTOLIN HFA     Inhale 2 puffs into the lungs every 6 hours       amphetamine-dextroamphetamine 30 MG per 24 hr capsule    ADDERALL XR    30 capsule    Take 1 capsule (30 mg) by mouth daily       atenolol 25 MG tablet    TENORMIN    30 tablet    Take 1 tablet (25 mg) by mouth daily       cetirizine 10 MG tablet    zyrTEC    30 tablet    Take 1 tablet (10 mg) by mouth every evening       fluticasone 50 MCG/BLIST Aepb    FLOVENT DISKUS     Inhale 1 puff into the lungs every 12 hours       hydrochlorothiazide 25 MG tablet    HYDRODIURIL    30 tablet    Take 1 tablet (25 mg) by mouth daily       levothyroxine 175 MCG tablet    SYNTHROID/LEVOTHROID    30 tablet    Take 1 tablet (175 mcg) by mouth daily       nicotine polacrilex 4 MG lozenge    COMMIT    360 tablet    Place 1 lozenge (4 mg) inside cheek as needed for smoking cessation (every 2-3 hours as needed for cravings)       omeprazole 20 MG CR capsule    priLOSEC    30 capsule    Take 1 capsule (20 mg) by mouth daily

## 2024-10-20 NOTE — IP AVS SNAPSHOT
[facility], [place], Endoscopy    [address] [address]    Phone:  [phone]                                       After Visit Summary   5/18/2017    Phan Eduardo    MRN: [id number]           After Visit Summary Signature Page     I have received my discharge instructions, and my questions have been answered. I have discussed any challenges I see with this plan with the nurse or doctor.    ..........................................................................................................................................  Patient/Patient Representative Signature      ..........................................................................................................................................  Patient Representative Print Name and Relationship to Patient    ..................................................               ................................................  Date                                            Time    ..........................................................................................................................................  Reviewed by Signature/Title    ...................................................              ..............................................  Date                                                            Time           no concerns